# Patient Record
Sex: FEMALE | Race: BLACK OR AFRICAN AMERICAN | NOT HISPANIC OR LATINO | URBAN - METROPOLITAN AREA
[De-identification: names, ages, dates, MRNs, and addresses within clinical notes are randomized per-mention and may not be internally consistent; named-entity substitution may affect disease eponyms.]

---

## 2017-01-18 ENCOUNTER — EMERGENCY (EMERGENCY)
Facility: HOSPITAL | Age: 32
LOS: 1 days | Discharge: HOME | End: 2017-01-18
Admitting: EMERGENCY MEDICINE
Payer: COMMERCIAL

## 2017-01-18 VITALS
HEART RATE: 113 BPM | TEMPERATURE: 99 F | OXYGEN SATURATION: 100 % | SYSTOLIC BLOOD PRESSURE: 115 MMHG | DIASTOLIC BLOOD PRESSURE: 76 MMHG | RESPIRATION RATE: 20 BRPM

## 2017-01-18 LAB
ALBUMIN SERPL ELPH-MCNC: 4.3 G/DL — SIGNIFICANT CHANGE UP (ref 3.3–5)
ALP SERPL-CCNC: 96 U/L — SIGNIFICANT CHANGE UP (ref 40–120)
ALT FLD-CCNC: 17 U/L — SIGNIFICANT CHANGE UP (ref 4–33)
AST SERPL-CCNC: 15 U/L — SIGNIFICANT CHANGE UP (ref 4–32)
BASOPHILS # BLD AUTO: 0 K/UL — SIGNIFICANT CHANGE UP (ref 0–0.2)
BASOPHILS NFR BLD AUTO: 0 % — SIGNIFICANT CHANGE UP (ref 0–2)
BILIRUB SERPL-MCNC: 0.5 MG/DL — SIGNIFICANT CHANGE UP (ref 0.2–1.2)
BUN SERPL-MCNC: 8 MG/DL — SIGNIFICANT CHANGE UP (ref 7–23)
CALCIUM SERPL-MCNC: 8.7 MG/DL — SIGNIFICANT CHANGE UP (ref 8.4–10.5)
CHLORIDE SERPL-SCNC: 98 MMOL/L — SIGNIFICANT CHANGE UP (ref 98–107)
CO2 SERPL-SCNC: 26 MMOL/L — SIGNIFICANT CHANGE UP (ref 22–31)
CREAT SERPL-MCNC: 0.76 MG/DL — SIGNIFICANT CHANGE UP (ref 0.5–1.3)
EOSINOPHIL # BLD AUTO: 0.01 K/UL — SIGNIFICANT CHANGE UP (ref 0–0.5)
EOSINOPHIL NFR BLD AUTO: 0.2 % — SIGNIFICANT CHANGE UP (ref 0–6)
GLUCOSE SERPL-MCNC: 95 MG/DL — SIGNIFICANT CHANGE UP (ref 70–99)
HCT VFR BLD CALC: 40.6 % — SIGNIFICANT CHANGE UP (ref 34.5–45)
HGB BLD-MCNC: 13 G/DL — SIGNIFICANT CHANGE UP (ref 11.5–15.5)
IMM GRANULOCYTES NFR BLD AUTO: 0.2 % — SIGNIFICANT CHANGE UP (ref 0–1.5)
LIDOCAIN IGE QN: 24.3 U/L — SIGNIFICANT CHANGE UP (ref 7–60)
LYMPHOCYTES # BLD AUTO: 1.05 K/UL — SIGNIFICANT CHANGE UP (ref 1–3.3)
LYMPHOCYTES # BLD AUTO: 16.2 % — SIGNIFICANT CHANGE UP (ref 13–44)
MCHC RBC-ENTMCNC: 24.9 PG — LOW (ref 27–34)
MCHC RBC-ENTMCNC: 32 % — SIGNIFICANT CHANGE UP (ref 32–36)
MCV RBC AUTO: 77.6 FL — LOW (ref 80–100)
MONOCYTES # BLD AUTO: 0.3 K/UL — SIGNIFICANT CHANGE UP (ref 0–0.9)
MONOCYTES NFR BLD AUTO: 4.6 % — SIGNIFICANT CHANGE UP (ref 2–14)
NEUTROPHILS # BLD AUTO: 5.12 K/UL — SIGNIFICANT CHANGE UP (ref 1.8–7.4)
NEUTROPHILS NFR BLD AUTO: 78.8 % — HIGH (ref 43–77)
PLATELET # BLD AUTO: 322 K/UL — SIGNIFICANT CHANGE UP (ref 150–400)
PMV BLD: 9.3 FL — SIGNIFICANT CHANGE UP (ref 7–13)
POTASSIUM SERPL-MCNC: 3.3 MMOL/L — LOW (ref 3.5–5.3)
POTASSIUM SERPL-SCNC: 3.3 MMOL/L — LOW (ref 3.5–5.3)
PROT SERPL-MCNC: 8.1 G/DL — SIGNIFICANT CHANGE UP (ref 6–8.3)
RBC # BLD: 5.23 M/UL — HIGH (ref 3.8–5.2)
RBC # FLD: 14.3 % — SIGNIFICANT CHANGE UP (ref 10.3–14.5)
SODIUM SERPL-SCNC: 139 MMOL/L — SIGNIFICANT CHANGE UP (ref 135–145)
WBC # BLD: 6.49 K/UL — SIGNIFICANT CHANGE UP (ref 3.8–10.5)
WBC # FLD AUTO: 6.49 K/UL — SIGNIFICANT CHANGE UP (ref 3.8–10.5)

## 2017-01-18 PROCEDURE — 99284 EMERGENCY DEPT VISIT MOD MDM: CPT

## 2017-01-18 RX ORDER — ACETAMINOPHEN 500 MG
650 TABLET ORAL ONCE
Qty: 0 | Refills: 0 | Status: COMPLETED | OUTPATIENT
Start: 2017-01-18 | End: 2017-01-18

## 2017-01-18 RX ORDER — FAMOTIDINE 10 MG/ML
20 INJECTION INTRAVENOUS ONCE
Qty: 0 | Refills: 0 | Status: COMPLETED | OUTPATIENT
Start: 2017-01-18 | End: 2017-01-18

## 2017-01-18 RX ORDER — SODIUM CHLORIDE 9 MG/ML
1000 INJECTION INTRAMUSCULAR; INTRAVENOUS; SUBCUTANEOUS ONCE
Qty: 0 | Refills: 0 | Status: COMPLETED | OUTPATIENT
Start: 2017-01-18 | End: 2017-01-18

## 2017-01-18 RX ORDER — ONDANSETRON 8 MG/1
4 TABLET, FILM COATED ORAL ONCE
Qty: 0 | Refills: 0 | Status: COMPLETED | OUTPATIENT
Start: 2017-01-18 | End: 2017-01-18

## 2017-01-18 RX ORDER — ONDANSETRON 8 MG/1
1 TABLET, FILM COATED ORAL
Qty: 9 | Refills: 0 | OUTPATIENT
Start: 2017-01-18 | End: 2017-01-21

## 2017-01-18 RX ADMIN — Medication 650 MILLIGRAM(S): at 22:51

## 2017-01-18 RX ADMIN — FAMOTIDINE 20 MILLIGRAM(S): 10 INJECTION INTRAVENOUS at 20:58

## 2017-01-18 RX ADMIN — SODIUM CHLORIDE 1000 MILLILITER(S): 9 INJECTION INTRAMUSCULAR; INTRAVENOUS; SUBCUTANEOUS at 20:55

## 2017-01-18 RX ADMIN — ONDANSETRON 4 MILLIGRAM(S): 8 TABLET, FILM COATED ORAL at 20:59

## 2017-01-18 RX ADMIN — ONDANSETRON 4 MILLIGRAM(S): 8 TABLET, FILM COATED ORAL at 22:57

## 2017-01-18 NOTE — ED PROVIDER NOTE - PROGRESS NOTE DETAILS
BHAKTI Deal- Pt feeling better after ER stay, tolerating PO, abd soft nt nd, afebrile. stable for dc. Pt to f/u with PMD

## 2017-01-18 NOTE — ED PROVIDER NOTE - OBJECTIVE STATEMENT
32 y/o female no pmh presents c/o n/v/d x1 day. Pt admits to waking up in the middle of the night with nausea and vomiting. Pt admits to multiple episodes of vomiting and diarrhea, nbnb. Pt also c/o intermittent frontal headache, throbbing, no aggravating or relieving factors. Pt admits to not tolerating PO for 1 day. Pt denies recent travel, sick contacts or strange food. Denies chest pain, sob, abd pain, dysuria, neck pain, dizziness, weakness, syncope, or fever.

## 2017-09-20 ENCOUNTER — APPOINTMENT (OUTPATIENT)
Dept: HUMAN REPRODUCTION | Facility: CLINIC | Age: 32
End: 2017-09-20

## 2017-12-13 ENCOUNTER — APPOINTMENT (OUTPATIENT)
Dept: OBGYN | Facility: CLINIC | Age: 32
End: 2017-12-13

## 2018-01-11 ENCOUNTER — OUTPATIENT (OUTPATIENT)
Dept: OUTPATIENT SERVICES | Facility: HOSPITAL | Age: 33
LOS: 1 days | End: 2018-01-11
Payer: COMMERCIAL

## 2018-01-11 VITALS
TEMPERATURE: 98 F | OXYGEN SATURATION: 99 % | HEIGHT: 63 IN | SYSTOLIC BLOOD PRESSURE: 98 MMHG | RESPIRATION RATE: 16 BRPM | HEART RATE: 91 BPM | WEIGHT: 214.95 LBS | DIASTOLIC BLOOD PRESSURE: 68 MMHG

## 2018-01-11 DIAGNOSIS — Z98.891 HISTORY OF UTERINE SCAR FROM PREVIOUS SURGERY: Chronic | ICD-10-CM

## 2018-01-11 DIAGNOSIS — Z33.2 ENCOUNTER FOR ELECTIVE TERMINATION OF PREGNANCY: Chronic | ICD-10-CM

## 2018-01-11 DIAGNOSIS — Z98.890 OTHER SPECIFIED POSTPROCEDURAL STATES: Chronic | ICD-10-CM

## 2018-01-11 DIAGNOSIS — O02.1 MISSED ABORTION: ICD-10-CM

## 2018-01-11 DIAGNOSIS — Z90.49 ACQUIRED ABSENCE OF OTHER SPECIFIED PARTS OF DIGESTIVE TRACT: Chronic | ICD-10-CM

## 2018-01-11 DIAGNOSIS — G56.00 CARPAL TUNNEL SYNDROME, UNSPECIFIED UPPER LIMB: Chronic | ICD-10-CM

## 2018-01-11 LAB
BLD GP AB SCN SERPL QL: NEGATIVE — SIGNIFICANT CHANGE UP
HCT VFR BLD CALC: 42 % — SIGNIFICANT CHANGE UP (ref 34.5–45)
HGB BLD-MCNC: 13.7 G/DL — SIGNIFICANT CHANGE UP (ref 11.5–15.5)
MCHC RBC-ENTMCNC: 26.1 PG — LOW (ref 27–34)
MCHC RBC-ENTMCNC: 32.6 % — SIGNIFICANT CHANGE UP (ref 32–36)
MCV RBC AUTO: 80 FL — SIGNIFICANT CHANGE UP (ref 80–100)
NRBC # FLD: 0 — SIGNIFICANT CHANGE UP
PLATELET # BLD AUTO: 292 K/UL — SIGNIFICANT CHANGE UP (ref 150–400)
PMV BLD: 9.2 FL — SIGNIFICANT CHANGE UP (ref 7–13)
RBC # BLD: 5.25 M/UL — HIGH (ref 3.8–5.2)
RBC # FLD: 14.3 % — SIGNIFICANT CHANGE UP (ref 10.3–14.5)
RH IG SCN BLD-IMP: POSITIVE — SIGNIFICANT CHANGE UP
WBC # BLD: 6.08 K/UL — SIGNIFICANT CHANGE UP (ref 3.8–10.5)
WBC # FLD AUTO: 6.08 K/UL — SIGNIFICANT CHANGE UP (ref 3.8–10.5)

## 2018-01-11 PROCEDURE — 93010 ELECTROCARDIOGRAM REPORT: CPT

## 2018-01-11 NOTE — H&P PST ADULT - RS GEN PE MLT RESP DETAILS PC
no chest wall tenderness/respirations non-labored/airway patent/breath sounds equal/good air movement/clear to auscultation bilaterally

## 2018-01-11 NOTE — H&P PST ADULT - PSH
Carpal tunnel syndrome    H/O  section  x1  History of cholecystectomy    S/P D&C (status post dilation and curettage)  2/2 missed AB in   Termination of pregnancy (fetus)   Carpal tunnel syndrome    H/O  section  x1  History of cholecystectomy  2005  History of myomectomy    S/P D&C (status post dilation and curettage)  2/2 missed AB in   Termination of pregnancy (fetus)

## 2018-01-11 NOTE — H&P PST ADULT - NEGATIVE BREAST SYMPTOMS
no breast lump L/no breast lump R/no nipple discharge L/no breast tenderness R/no nipple discharge R/no breast tenderness L

## 2018-01-11 NOTE — H&P PST ADULT - CARDIOVASCULAR COMMENTS
pt reports periods of chest discomfort not precipitated by any specific activity , last episode was " a few months ago". Pt states episodes are rare only lasting a few seconds. Denies if discomfort travels down left arm or jaw. pt reports periods of chest discomfort not precipitated by any specific activity , last episode was " a few months ago". No episodes since. Pt states episodes are rare only lasting a few seconds. Denies if discomfort travels down left arm or jaw.

## 2018-01-11 NOTE — H&P PST ADULT - PMH
Anemia  pt states she was informed by her GYN that she may have thalasemmia due to results of recent blood work. Pt was to follow up for additional blood work but has not gone yet  Missed ab    Vitamin D deficiency Anemia  pt states she was informed by her GYN that she may have thalassemia due to results of recent blood work. Pt was to follow up for additional blood work but has not gone yet  Missed ab    Vitamin D deficiency

## 2018-01-11 NOTE — H&P PST ADULT - HISTORY OF PRESENT ILLNESS
31 y/o female  presents to PST for preoperative evaluation with dx of missed . Pt states 4 days ago she began cramping and noticed vaginal spotting. Seen by OB/GYN and s/p sonogram which did not note a fetal heart beat. Scheduled for Dilation Vacuum Curettage on 2018. 31 y/o female  presents to PST for preoperative evaluation with dx of missed . Pt states 4 days ago she began cramping and noticed vaginal spotting. Seen by OB/GYN and s/p sonogram which did not  a fetal heartbeat. Scheduled for Dilation Vacuum Curettage on 2018.

## 2018-01-11 NOTE — H&P PST ADULT - NSANTHOSAYNRD_GEN_A_CORE
No. DIVYA screening performed.  STOP BANG Legend: 0-2 = LOW Risk; 3-4 = INTERMEDIATE Risk; 5-8 = HIGH Risk

## 2018-01-11 NOTE — H&P PST ADULT - PROBLEM SELECTOR PLAN 1
Scheduled for Dilation Vacuum Curettage on 1/12/2018.  Pre op instructions given, pt verbalized understanding   GI prophylaxis provided  Next day case discussed with PST Anesthesiologist Dr. Franco

## 2018-01-12 ENCOUNTER — APPOINTMENT (OUTPATIENT)
Dept: ANTEPARTUM | Facility: CLINIC | Age: 33
End: 2018-01-12

## 2018-01-13 ENCOUNTER — TRANSCRIPTION ENCOUNTER (OUTPATIENT)
Age: 33
End: 2018-01-13

## 2018-05-04 ENCOUNTER — EMERGENCY (EMERGENCY)
Facility: HOSPITAL | Age: 33
LOS: 1 days | Discharge: HOME | End: 2018-05-04
Attending: EMERGENCY MEDICINE | Admitting: EMERGENCY MEDICINE
Payer: COMMERCIAL

## 2018-05-04 VITALS
DIASTOLIC BLOOD PRESSURE: 67 MMHG | TEMPERATURE: 99 F | RESPIRATION RATE: 16 BRPM | OXYGEN SATURATION: 99 % | HEART RATE: 96 BPM | SYSTOLIC BLOOD PRESSURE: 119 MMHG

## 2018-05-04 VITALS
HEART RATE: 92 BPM | OXYGEN SATURATION: 100 % | TEMPERATURE: 99 F | RESPIRATION RATE: 16 BRPM | DIASTOLIC BLOOD PRESSURE: 70 MMHG | SYSTOLIC BLOOD PRESSURE: 114 MMHG

## 2018-05-04 DIAGNOSIS — Z33.2 ENCOUNTER FOR ELECTIVE TERMINATION OF PREGNANCY: Chronic | ICD-10-CM

## 2018-05-04 DIAGNOSIS — G56.00 CARPAL TUNNEL SYNDROME, UNSPECIFIED UPPER LIMB: Chronic | ICD-10-CM

## 2018-05-04 DIAGNOSIS — Z98.890 OTHER SPECIFIED POSTPROCEDURAL STATES: Chronic | ICD-10-CM

## 2018-05-04 DIAGNOSIS — Z90.49 ACQUIRED ABSENCE OF OTHER SPECIFIED PARTS OF DIGESTIVE TRACT: Chronic | ICD-10-CM

## 2018-05-04 DIAGNOSIS — Z98.891 HISTORY OF UTERINE SCAR FROM PREVIOUS SURGERY: Chronic | ICD-10-CM

## 2018-05-04 PROCEDURE — 99283 EMERGENCY DEPT VISIT LOW MDM: CPT

## 2018-05-04 RX ORDER — IBUPROFEN 200 MG
600 TABLET ORAL ONCE
Qty: 0 | Refills: 0 | Status: COMPLETED | OUTPATIENT
Start: 2018-05-04 | End: 2018-05-04

## 2018-05-04 RX ORDER — AMOXICILLIN 250 MG/5ML
1 SUSPENSION, RECONSTITUTED, ORAL (ML) ORAL
Qty: 21 | Refills: 0
Start: 2018-05-04 | End: 2018-05-10

## 2018-05-04 RX ADMIN — Medication 600 MILLIGRAM(S): at 10:03

## 2018-05-04 NOTE — ED PROVIDER NOTE - OBJECTIVE STATEMENT
33 yo F pt with no sig PMHx, arrives to the ED c/o worsening (yesterday) left lower jaw pain/swelling and left lower dental pain for 2 days. Pt notes that she noticed her left lower tooth was cracked. Pt notes taking one Amoxicillin yesterday; no other meds. taken. LNMP: "mid-April." Social hx; occasional EtOH use. Denies fever, difficulty swallowing, or any other complaints. No daily meds. NKDA.

## 2018-05-04 NOTE — ED PROVIDER NOTE - PMH
Anemia  pt states she was informed by her GYN that she may have thalassemia due to results of recent blood work. Pt was to follow up for additional blood work but has not gone yet  Missed ab    Vitamin D deficiency

## 2018-05-04 NOTE — CONSULT NOTE ADULT - SUBJECTIVE AND OBJECTIVE BOX
Patient is a 32y old  Female who presents with a chief complaint of pain and swelling in the LL quadrant     HPI: Pt reports having a throbbing pain last night and waking up in the morning with swelling. LL quadrant swollen and hot/tender to touch       PAST MEDICAL & SURGICAL HISTORY:  Vitamin D deficiency  Missed ab  Anemia: pt states she was informed by her GYN that she may have thalassemia due to results of recent blood work. Pt was to follow up for additional blood work but has not gone yet  History of myomectomy  Carpal tunnel syndrome  H/O  section: x1  Termination of pregnancy (fetus):   S/P D&C (status post dilation and curettage): 2/2 missed AB in   History of cholecystectomy:       MEDICATIONS  (STANDING):    MEDICATIONS  (PRN): none       Allergies:No Known Allergies    FAMILY HISTORY:  No pertinent family history in first degree relatives      *SOCIAL HISTORY: (guardian or who pt came with), (smoking hx)    *Last Dental Visit:    Vital Signs Last 24 Hrs  T(C): 37.1 (04 May 2018 11:26), Max: 37.2 (04 May 2018 08:40)  T(F): 98.7 (04 May 2018 11:26), Max: 98.9 (04 May 2018 08:40)  HR: 92 (04 May 2018 11:26) (92 - 96)  BP: 114/70 (04 May 2018 11:26) (114/70 - 119/67)  BP(mean): --  RR: 16 (04 May 2018 11:26) (16 - 16)  SpO2: 100% (04 May 2018 11:26) (99% - 100%)    EOE:  TMJ (  - ) clicks                    ( -   ) pops                    (  -  ) crepitus             Mandible FROM             Facial bones and MOM grossly intact             ( -  ) trismus             (  - ) LAD             (  + ) swelling LL quadrant             ( +  ) asymmetry LL- hot and tender to palpation              ( +  ) palpation- LL hot and tender             IOE:  permanent dentition: grossly intact           hard/soft palate:  (  - ) palatal torus           tongue/FOM WNL           labial/buccal mucosa WNL           vestibular fullness associated with buccal of # 19            # 19 D fracture- with gingiva overgrowth            ( +  ) percussion # 19           (  + ) palpation # 19            *DENTAL RADIOGRAPHS: 1 PA taken     RADIOLOGY & ADDITIONAL STUDIES:    ASSESSMENT: fractured # 19 with PARL associated with M and D roots.     PROCEDURE:  Verbal  consent given. EOE and IOE performed. 1 PA taken- PARL noted around apices of # 19. 2.5 carpules of 2% lidocaine with 1:100k epi administered via IANB and infiltration to area of # 19. I&D completed with 15 blade and blunt dissection. Heme and purulence noted. Irrigated with saline. Iodoform and silk suture placed. POI given      RECOMMENDATIONS:  1) Abx per ED  2) Dental F/U with outpatient dentist for comprehensive dental care.   3) If any difficulty swallowing/breathing, fever occur, page dental.   Alison Encarnacion DDS [id number]

## 2018-05-04 NOTE — ED ADULT TRIAGE NOTE - CHIEF COMPLAINT QUOTE
pt c/o left sided jaw swelling that started yesterday but has gotten progressively worse throughout the night. pt has cracked tooth on the left side but denies any pain. denies any fevers. denies any difficulty breathing. respirations even and unlabored, pt able to swallow, no drooling. pmhx: vit d deficiency, anemia

## 2018-05-04 NOTE — ED ADULT NURSE NOTE - OBJECTIVE STATEMENT
pt A&Ox3 left sided jaw swelling noted pt able to swallow speaking full sentences, pt waiting for dental

## 2018-05-04 NOTE — ED PROVIDER NOTE - PROGRESS NOTE DETAILS
Seen and I&D by dental who states DC on Amoxicillin f/u dental clinic mon for drain removal  DC instructions:   You had an abscess of tooth #19 drained and need to return Monday to dental clinic for drain removal: tel [phone]. Take Amoxicillin 500mg 3x daily, For pain Ibuprofen 600mg with food every 6 hours

## 2019-03-01 ENCOUNTER — APPOINTMENT (OUTPATIENT)
Dept: ANTEPARTUM | Facility: CLINIC | Age: 34
End: 2019-03-01
Payer: COMMERCIAL

## 2019-03-01 ENCOUNTER — ASOB RESULT (OUTPATIENT)
Age: 34
End: 2019-03-01

## 2019-03-01 PROCEDURE — 76813 OB US NUCHAL MEAS 1 GEST: CPT

## 2019-03-01 PROCEDURE — 36416 COLLJ CAPILLARY BLOOD SPEC: CPT

## 2019-03-01 PROCEDURE — 76801 OB US < 14 WKS SINGLE FETUS: CPT

## 2019-04-25 ENCOUNTER — APPOINTMENT (OUTPATIENT)
Dept: ANTEPARTUM | Facility: CLINIC | Age: 34
End: 2019-04-25
Payer: COMMERCIAL

## 2019-04-25 ENCOUNTER — ASOB RESULT (OUTPATIENT)
Age: 34
End: 2019-04-25

## 2019-04-25 PROCEDURE — 76817 TRANSVAGINAL US OBSTETRIC: CPT

## 2019-04-25 PROCEDURE — 76811 OB US DETAILED SNGL FETUS: CPT

## 2019-05-08 ENCOUNTER — APPOINTMENT (OUTPATIENT)
Dept: ANTEPARTUM | Facility: CLINIC | Age: 34
End: 2019-05-08
Payer: COMMERCIAL

## 2019-05-08 ENCOUNTER — ASOB RESULT (OUTPATIENT)
Age: 34
End: 2019-05-08

## 2019-05-08 PROCEDURE — 76817 TRANSVAGINAL US OBSTETRIC: CPT

## 2019-05-08 PROCEDURE — 76816 OB US FOLLOW-UP PER FETUS: CPT

## 2019-05-16 ENCOUNTER — OUTPATIENT (OUTPATIENT)
Dept: OUTPATIENT SERVICES | Facility: HOSPITAL | Age: 34
LOS: 1 days | Discharge: HOME | End: 2019-05-16

## 2019-05-16 VITALS — HEART RATE: 101 BPM | DIASTOLIC BLOOD PRESSURE: 74 MMHG | SYSTOLIC BLOOD PRESSURE: 118 MMHG

## 2019-05-16 VITALS
DIASTOLIC BLOOD PRESSURE: 80 MMHG | SYSTOLIC BLOOD PRESSURE: 129 MMHG | RESPIRATION RATE: 16 BRPM | TEMPERATURE: 99 F | HEART RATE: 99 BPM

## 2019-05-16 DIAGNOSIS — Z98.890 OTHER SPECIFIED POSTPROCEDURAL STATES: Chronic | ICD-10-CM

## 2019-05-16 DIAGNOSIS — Z90.49 ACQUIRED ABSENCE OF OTHER SPECIFIED PARTS OF DIGESTIVE TRACT: Chronic | ICD-10-CM

## 2019-05-16 DIAGNOSIS — Z98.891 HISTORY OF UTERINE SCAR FROM PREVIOUS SURGERY: Chronic | ICD-10-CM

## 2019-05-16 DIAGNOSIS — G56.00 CARPAL TUNNEL SYNDROME, UNSPECIFIED UPPER LIMB: Chronic | ICD-10-CM

## 2019-05-16 DIAGNOSIS — Z3A.00 WEEKS OF GESTATION OF PREGNANCY NOT SPECIFIED: ICD-10-CM

## 2019-05-16 DIAGNOSIS — Z33.2 ENCOUNTER FOR ELECTIVE TERMINATION OF PREGNANCY: Chronic | ICD-10-CM

## 2019-05-16 DIAGNOSIS — O26.899 OTHER SPECIFIED PREGNANCY RELATED CONDITIONS, UNSPECIFIED TRIMESTER: ICD-10-CM

## 2019-05-16 LAB
APPEARANCE UR: CLEAR — SIGNIFICANT CHANGE UP
BILIRUB UR-MCNC: NEGATIVE — SIGNIFICANT CHANGE UP
BLOOD UR QL VISUAL: NEGATIVE — SIGNIFICANT CHANGE UP
COLOR SPEC: SIGNIFICANT CHANGE UP
GLUCOSE UR-MCNC: NEGATIVE — SIGNIFICANT CHANGE UP
KETONES UR-MCNC: NEGATIVE — SIGNIFICANT CHANGE UP
LEUKOCYTE ESTERASE UR-ACNC: NEGATIVE — SIGNIFICANT CHANGE UP
NITRITE UR-MCNC: NEGATIVE — SIGNIFICANT CHANGE UP
PH UR: 6 — SIGNIFICANT CHANGE UP (ref 5–8)
PROT UR-MCNC: NEGATIVE — SIGNIFICANT CHANGE UP
SP GR SPEC: 1.01 — SIGNIFICANT CHANGE UP (ref 1–1.04)
UROBILINOGEN FLD QL: NORMAL — SIGNIFICANT CHANGE UP

## 2019-05-16 NOTE — OB PROVIDER TRIAGE NOTE - HISTORY OF PRESENT ILLNESS
Dr. Becerra's pt. is a 34y/o EGA 22.6wks . Pt. reports of constant pelvic pressure since yesterday. Pt. denies abdominal cramping, LOF, VB, or decrease FM.

## 2019-05-16 NOTE — OB RN TRIAGE NOTE - PMH
Anemia  pt states she was informed by her GYN that she may have thalassemia due to results of recent blood work. Pt was to follow up for additional blood work but has not gone yet  Missed ab    Spontaneous     Termination of pregnancy (fetus)    Vitamin D deficiency

## 2019-05-16 NOTE — OB PROVIDER TRIAGE NOTE - NSHPPHYSICALEXAM_GEN_ALL_CORE
Assessment/Plan:  TAS:Cephalic presentation, posterior placenta, FHR:155bpm, MVP:6.4, EFW:563.92gm, anterior myomia 1.4x1.57  Speculum:Cervix appears closed  TVS:CL:4.4 no funneling or dynamic changes  (ATU 5/8/19 CL:4.5cm)  U/A:Ketone:Neg., Blood:Neg., Nitrite:Neg., Leukocyte Esterase:Neg.  No contractions on TOCO

## 2019-05-16 NOTE — OB PROVIDER TRIAGE NOTE - NSOBPROVIDERNOTE_OBGYN_ALL_OB_FT
Dr. Becerra's pt. is a 34y/o EGA 22.6wks . Pt. reports of constant pelvic pressure since yesterday. Pt. denies abdominal cramping, LOF, VB, or decrease FM.    AP:Denies  Medical Hx: Carpel tunnel  Surgical Hx: Myomectomy , Cholecystectomy    OBGYN Hx: Primary  @32wks PPROM  NRFHT 4-7  Fibroid x1   Meds:[facility] IM weekly on , PNV  NKDA    Assessment/Plan:  TAS:Cephalic presentation, posterior placenta, FHR:155bpm, MVP:6.4, EFW:563.92gm, anterior myomia 1.4x1.57  Speculum:Cervix appears closed  TVS:CL:4.4 no funneling or dynamic changes  (ATU 19 CL:4.5cm)  U/A:Ketone:Neg., Blood:Neg., Nitrite:Neg., Leukocyte Esterase:Neg.  No contractions on TOCO    No evidence of  labor at this time. Pt. to follow up with next OB appointment. Dr. Becerra's pt. is a 34y/o EGA 22.6wks . Pt. reports of constant pelvic pressure since yesterday. Pt. denies abdominal cramping, LOF, VB, or decrease FM.    AP:Denies  Medical Hx: Carpel tunnel  Surgical Hx: Myomectomy , Cholecystectomy    OBGYN Hx: Primary  @32wks PPROM  NRFHT 4-7  Fibroid x1   Meds: [facility] IM weekly on , PNV  NKDA    Assessment/Plan:  TAS:Cephalic presentation, posterior placenta, FHR:155bpm, MVP:6.4, EFW:563.92gm, anterior myoma 1.4x1.57  Speculum: Cervix appears closed  TVS:CL:4.4 no funneling or dynamic changes  (ATU 19 CL:4.5cm)  U/A:Ketone:Neg., Blood:Neg., Nitrite:Neg., Leukocyte Esterase:Neg.  No contractions on TOCO    No evidence of  labor at this time. Pt. to follow up with next OB appointment. Pt. instructed to return to triage with increase abdominal cramping, LOF, VB, or decrease FM. Increase PO hydration encouraged.

## 2019-05-16 NOTE — OB PROVIDER TRIAGE NOTE - ADDITIONAL INSTRUCTIONS
No evidence of  labor at this time. Pt. to follow up with next OB appointment. Pt. instructed to return to triage with increase abdominal cramping, LOF, VB, or decrease FM. Increase PO hydration encouraged

## 2019-05-16 NOTE — OB RN TRIAGE NOTE - PSH
Carpal tunnel syndrome    H/O  section  PPROM 32 weeks, NRFHT #4-7 2015  History of cholecystectomy    History of myomectomy    S/P D&C (status post dilation and curettage)  2/2 missed AB in   Termination of pregnancy (fetus)

## 2019-05-23 ENCOUNTER — ASOB RESULT (OUTPATIENT)
Age: 34
End: 2019-05-23

## 2019-05-23 ENCOUNTER — APPOINTMENT (OUTPATIENT)
Dept: ANTEPARTUM | Facility: CLINIC | Age: 34
End: 2019-05-23
Payer: COMMERCIAL

## 2019-05-23 PROCEDURE — 76817 TRANSVAGINAL US OBSTETRIC: CPT

## 2019-05-23 PROCEDURE — 76815 OB US LIMITED FETUS(S): CPT

## 2019-06-19 ENCOUNTER — ASOB RESULT (OUTPATIENT)
Age: 34
End: 2019-06-19

## 2019-06-19 ENCOUNTER — APPOINTMENT (OUTPATIENT)
Dept: ANTEPARTUM | Facility: CLINIC | Age: 34
End: 2019-06-19
Payer: COMMERCIAL

## 2019-06-19 PROCEDURE — 76816 OB US FOLLOW-UP PER FETUS: CPT

## 2019-07-29 ENCOUNTER — APPOINTMENT (OUTPATIENT)
Dept: ANTEPARTUM | Facility: CLINIC | Age: 34
End: 2019-07-29
Payer: COMMERCIAL

## 2019-07-29 ENCOUNTER — ASOB RESULT (OUTPATIENT)
Age: 34
End: 2019-07-29

## 2019-07-29 PROCEDURE — 76816 OB US FOLLOW-UP PER FETUS: CPT

## 2019-07-29 PROCEDURE — 76819 FETAL BIOPHYS PROFIL W/O NST: CPT

## 2019-08-15 ENCOUNTER — INPATIENT (INPATIENT)
Facility: HOSPITAL | Age: 34
LOS: 0 days | Discharge: HOME | End: 2019-08-16
Attending: OBSTETRICS & GYNECOLOGY | Admitting: OBSTETRICS & GYNECOLOGY

## 2019-08-15 VITALS
RESPIRATION RATE: 16 BRPM | DIASTOLIC BLOOD PRESSURE: 67 MMHG | HEART RATE: 100 BPM | SYSTOLIC BLOOD PRESSURE: 115 MMHG | TEMPERATURE: 99 F
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
## 2019-08-15 DIAGNOSIS — Z33.2 ENCOUNTER FOR ELECTIVE TERMINATION OF PREGNANCY: Chronic | ICD-10-CM

## 2019-08-15 DIAGNOSIS — Z98.890 OTHER SPECIFIED POSTPROCEDURAL STATES: Chronic | ICD-10-CM

## 2019-08-15 DIAGNOSIS — Z90.49 ACQUIRED ABSENCE OF OTHER SPECIFIED PARTS OF DIGESTIVE TRACT: Chronic | ICD-10-CM

## 2019-08-15 DIAGNOSIS — Z3A.00 WEEKS OF GESTATION OF PREGNANCY NOT SPECIFIED: ICD-10-CM

## 2019-08-15 DIAGNOSIS — G56.00 CARPAL TUNNEL SYNDROME, UNSPECIFIED UPPER LIMB: Chronic | ICD-10-CM

## 2019-08-15 DIAGNOSIS — Z98.891 HISTORY OF UTERINE SCAR FROM PREVIOUS SURGERY: Chronic | ICD-10-CM

## 2019-08-15 DIAGNOSIS — O26.899 OTHER SPECIFIED PREGNANCY RELATED CONDITIONS, UNSPECIFIED TRIMESTER: ICD-10-CM

## 2019-08-15 LAB
BLD GP AB SCN SERPL QL: NEGATIVE — SIGNIFICANT CHANGE UP
FIBRINOGEN PPP-MCNC: > 752 MG/DL — HIGH (ref 350–510)
HCT VFR BLD CALC: 37.1 % — SIGNIFICANT CHANGE UP (ref 34.5–45)
HGB BLD-MCNC: 11.9 G/DL — SIGNIFICANT CHANGE UP (ref 11.5–15.5)
MCHC RBC-ENTMCNC: 24.7 PG — LOW (ref 27–34)
MCHC RBC-ENTMCNC: 32.1 % — SIGNIFICANT CHANGE UP (ref 32–36)
MCV RBC AUTO: 77 FL — LOW (ref 80–100)
NRBC # FLD: 0 K/UL — SIGNIFICANT CHANGE UP (ref 0–0)
PLATELET # BLD AUTO: 262 K/UL — SIGNIFICANT CHANGE UP (ref 150–400)
PMV BLD: 9.1 FL — SIGNIFICANT CHANGE UP (ref 7–13)
RBC # BLD: 4.82 M/UL — SIGNIFICANT CHANGE UP (ref 3.8–5.2)
RBC # FLD: 15.2 % — HIGH (ref 10.3–14.5)
RH IG SCN BLD-IMP: POSITIVE — SIGNIFICANT CHANGE UP
WBC # BLD: 9.5 K/UL — SIGNIFICANT CHANGE UP (ref 3.8–10.5)
WBC # FLD AUTO: 9.5 K/UL — SIGNIFICANT CHANGE UP (ref 3.8–10.5)

## 2019-08-15 RX ORDER — OXYTOCIN 10 UNIT/ML
333.33 VIAL (ML) INJECTION
Qty: 20 | Refills: 0 | Status: DISCONTINUED | OUTPATIENT
Start: 2019-08-15 | End: 2019-08-16

## 2019-08-15 RX ORDER — SODIUM CHLORIDE 9 MG/ML
1000 INJECTION, SOLUTION INTRAVENOUS
Refills: 0 | Status: DISCONTINUED | OUTPATIENT
Start: 2019-08-15 | End: 2019-08-16

## 2019-08-15 RX ADMIN — SODIUM CHLORIDE 125 MILLILITER(S): 9 INJECTION, SOLUTION INTRAVENOUS at 18:17

## 2019-08-15 NOTE — OB PROVIDER TRIAGE NOTE - NS_PELVICEXAM_OBGYN_ALL_OB
Patient presents to the ER with right lower quadrant abd pain that started yesterday.  Patient states that it has just been getting worse.  Patient reports nausea but no vomiting.  States that she has been eatting and drinking ok and no change with bowel habits.  Patient denies any fever at this time.  
Deferred

## 2019-08-15 NOTE — OB PROVIDER H&P - ASSESSMENT
34yo  @ 35.6 wks SLIUP uncomp PNC here s/p fall  -NST x 4 hours  -B+ blood type; CBC and fibrinogen stable  -pt was dw Dr Salomon  -----------------  16:20-NST was reviewed with Dr Lance/Aime. NST is cat I with accels and mod variability; frquent ctx's  Plan-pt was admitted for observation

## 2019-08-15 NOTE — OB RN TRIAGE NOTE - CHIEF COMPLAINT QUOTE
s/p fall before 9am unsure if I hit belly it was more like a split and happened so fast  baby moving -but not moving at the same pace that he did

## 2019-08-15 NOTE — OB PROVIDER TRIAGE NOTE - HISTORY OF PRESENT ILLNESS
34yo  @ 35.6 wks SLIUP uncomp PNC here for eval due to fall at 9am. Pt reports she slipped on some muddy water and did a slit and landed on the floor, unsure if she hit her belly. Pt reports GFM and denies VB/LOF/ctx's/cramping. Pt is on [place] with last shot scheduled for tomorrow.    Pmhx-denies  Pshx/Hosp-dvc x 2; cholecstectomy; myomectomy;   Meds-PNV; nexium  Allergies-augmentin->thrush  Gyn-fibroids with myomectomy; abnl PAP  Past ob-SAB x 3 with dvc x 2; TOP x 1              3/15/2015-32wk PPROM with fetal distress->-4#7

## 2019-08-15 NOTE — OB RN PATIENT PROFILE - FALLEN IN THE PAST
How Severe Are Your Spot(S)?: moderate What Is The Reason For Today's Visit?: Skin Lesions What Is The Reason For Today's Visit? (Being Monitored For X): concerning skin lesions on an annual basis yes

## 2019-08-15 NOTE — OB PROVIDER TRIAGE NOTE - NSOBPROVIDERNOTE_OBGYN_ALL_OB_FT
34yo  @ 35.6 wks SLIUP uncomp PNC here s/p fall  -NST x 4 hours  -B+ blood type; CBC and fibrinogen sent 34yo  @ 35.6 wks SLIUP uncomp PNC here s/p fall  -NST x 4 hours  -B+ blood type; CBC and fibrinogen stable  -pt was dw Dr Salomon 34yo  @ 35.6 wks SLIUP uncomp PNC here s/p fall  -NST x 4 hours  -B+ blood type; CBC and fibrinogen stable  -pt was dw Dr Salomon  -----------------  16:20-NST was reviewed with Dr Lance/Aime. NST is cat I with accels and mod variability; frquent ctx's  Plan-pt was admitted for observation

## 2019-08-16 ENCOUNTER — ASOB RESULT (OUTPATIENT)
Age: 34
End: 2019-08-16

## 2019-08-16 ENCOUNTER — TRANSCRIPTION ENCOUNTER (OUTPATIENT)
Age: 34
End: 2019-08-16

## 2019-08-16 ENCOUNTER — OUTPATIENT (OUTPATIENT)
Dept: OUTPATIENT SERVICES | Facility: HOSPITAL | Age: 34
LOS: 1 days | End: 2019-08-16

## 2019-08-16 ENCOUNTER — APPOINTMENT (OUTPATIENT)
Dept: ANTEPARTUM | Facility: HOSPITAL | Age: 34
End: 2019-08-16
Payer: COMMERCIAL

## 2019-08-16 VITALS
OXYGEN SATURATION: 96 % | DIASTOLIC BLOOD PRESSURE: 65 MMHG | TEMPERATURE: 98 F | RESPIRATION RATE: 17 BRPM | HEART RATE: 87 BPM | SYSTOLIC BLOOD PRESSURE: 109 MMHG

## 2019-08-16 DIAGNOSIS — G56.00 CARPAL TUNNEL SYNDROME, UNSPECIFIED UPPER LIMB: Chronic | ICD-10-CM

## 2019-08-16 DIAGNOSIS — Z98.890 OTHER SPECIFIED POSTPROCEDURAL STATES: Chronic | ICD-10-CM

## 2019-08-16 DIAGNOSIS — Z98.891 HISTORY OF UTERINE SCAR FROM PREVIOUS SURGERY: Chronic | ICD-10-CM

## 2019-08-16 DIAGNOSIS — Z90.49 ACQUIRED ABSENCE OF OTHER SPECIFIED PARTS OF DIGESTIVE TRACT: Chronic | ICD-10-CM

## 2019-08-16 DIAGNOSIS — Z33.2 ENCOUNTER FOR ELECTIVE TERMINATION OF PREGNANCY: Chronic | ICD-10-CM

## 2019-08-16 LAB — T PALLIDUM AB TITR SER: NEGATIVE — SIGNIFICANT CHANGE UP

## 2019-08-16 PROCEDURE — 76816 OB US FOLLOW-UP PER FETUS: CPT | Mod: 26

## 2019-08-16 PROCEDURE — 76819 FETAL BIOPHYS PROFIL W/O NST: CPT | Mod: 26

## 2019-08-16 NOTE — DISCHARGE NOTE ANTEPARTUM - HOSPITAL COURSE
34y/o  at 36 wks admitted for prolonged monitoring s/p fall at 9am on 19.  Pt reports she slipped on some muddy water and did a slit and landed on the floor, unsure if she hit her abdomen. Pt denies vaginal bleeding, Leaking fluid, contractions, or abdominal pain. Pt reports good fetal movement. Pt is on [place] for hx of 32wk PPROM in last pregnancy. Patient has had Category one FHT with irregular contractions on [place] that she denies feeling. ATU sono 19:____________________. Patient is stable after 24 hours and ready to be discharged home with close outpatient monitoring by OB within one week. 32y/o  at 36 wks admitted for prolonged monitoring s/p fall at 9am on 8/15/19.  Pt reports she slipped on some muddy water and did a slit and landed on the floor, unsure if she hit her abdomen. Pt denies vaginal bleeding, Leaking fluid, contractions, or abdominal pain. Pt reports good fetal movement. Pt is on [place] for hx of 32wk PPROM in last pregnancy. Patient has had Category one FHT with irregular contractions on [place] that she denies feeling. ATU sono 19: cephalic presentation/ BPP   . Patient is stable after 24 hours and ready to be discharged home with close outpatient monitoring by OB within one week.

## 2019-08-16 NOTE — PROGRESS NOTE ADULT - PROBLEM SELECTOR PLAN 1
- CV: hemodynamically stable  - Resp: saturating well on RA  - GI: reg diet  - Heme: ambulation  - Ob: ATU sono today, discharge home    DEONDRE Berkowitz [id number]

## 2019-08-16 NOTE — PROGRESS NOTE ADULT - ASSESSMENT
34 yo  36w0d a/w fall yesterday. Fetal status reassuring and no active labor or signs of abruption noted

## 2019-08-16 NOTE — DISCHARGE NOTE ANTEPARTUM - CARE PLAN
Principal Discharge DX:	Fall, initial encounter  Goal:	Continue prenatal care  Assessment and plan of treatment:	- Follow up with ob doctor within one week  - Return with contractions, vaginal bleeding, abdominal pain, leaking fluid or decreased fetal movement

## 2019-08-16 NOTE — DISCHARGE NOTE ANTEPARTUM - PLAN OF CARE
Continue prenatal care - Follow up with ob doctor within one week  - Return with contractions, vaginal bleeding, abdominal pain, leaking fluid or decreased fetal movement

## 2019-08-16 NOTE — CHART NOTE - NSCHARTNOTEFT_GEN_A_CORE
NP follow up note:    Patient seen and evaluated for cervical change. Pt denies feeling contractions, vaginal bleeding, leaking fluid. Pt endorses good FM. Cervical unchanged at FT/ 50/-3      PE:  Vital Signs Last 24 Hrs  T(C): 36.7 (16 Aug 2019 10:52), Max: 37.3 (15 Aug 2019 11:53)  T(F): 98.1 (16 Aug 2019 10:52), Max: 99.14 (15 Aug 2019 12:30)  HR: 87 (16 Aug 2019 10:52) (66 - 108)  BP: 109/65 (16 Aug 2019 10:52) (89/54 - 119/76)  BP(mean): --  RR: 17 (16 Aug 2019 10:52) (16 - 17)  SpO2: 96% (16 Aug 2019 10:52) (91% - 99%)  Abd: gravid soft NT  EFM: 135 moderate variability + acels no decels  [place]: irregular  VE: 0.5/50/-3 unchanged    Plan:  -patient 24 hours post fall with Cat. I FHT and BPP 8/8 by ATU scan this AM  - unchanged cervix  -pt may be discharged to home with close outpatient follow up   -Pt to return with contractions, vaginal bleeding, leaking fluid, or decreased fetal movement  (see discharge plan)      d/w Dr Akers and Aubrey CABALLEROP-BC

## 2019-08-16 NOTE — DISCHARGE NOTE ANTEPARTUM - PATIENT PORTAL LINK FT
You can access the [facility] Patient Portal, offered by [facility], by registering with the following website: [email or web address]

## 2019-08-16 NOTE — DISCHARGE NOTE ANTEPARTUM - CARE PROVIDER_API CALL
Iza Crane)  Obstetrics and Gynecology  [address] [place] [address]  Phone: [phone]  Fax: [phone]  Follow Up Time:

## 2019-08-16 NOTE — PROGRESS NOTE ADULT - SUBJECTIVE AND OBJECTIVE BOX
R3 Antepartum Progress Note - HD#2    Subjective  Patient seen and examined at bedside, no acute overnight events. She states that she has not felt any contractions overnight. She reports good fetal movement and denies LOF, VB. Patient is ambulating, voiding spontaneously, passing gas, and tolerating regular diet.     Vital Signs Last 24 Hours  T(C): 36.7 (08-16-19 @ 10:52), Max: 37.3 (08-15-19 @ 11:53)  HR: 87 (08-16-19 @ 10:52) (66 - 108)  BP: 109/65 (08-16-19 @ 10:52) (89/54 - 119/76)  RR: 17 (08-16-19 @ 10:52) (16 - 17)  SpO2: 96% (08-16-19 @ 10:52) (91% - 99%)    Physical Exam:  General: NAD  CV: RRR, no murmurs, S1, S2  Resp: CTA-B, symmetrical expansion  Abdomen: Soft, non-tender, gravid   Ext: no edema/tenderness b/l     FHR tracing reactive    Labs:    Blood Type: B Positive  Antibody Screen: Negative  RPR: Negative               11.9   9.50  )-----------( 262      ( 08-15 @ 12:49 )             37.1         MEDICATIONS  (STANDING):  lactated ringers. 1000 milliLiter(s) (125 mL/Hr) IV Continuous <Continuous>  oxytocin Infusion 333.333 milliUNIT(s)/Min (1000 mL/Hr) IV Continuous <Continuous>    MEDICATIONS  (PRN):

## 2019-08-16 NOTE — DISCHARGE NOTE ANTEPARTUM - MEDICATION SUMMARY - MEDICATIONS TO TAKE
I will START or STAY ON the medications listed below when I get home from the hospital:    Prenatal 1 oral capsule  -- Indication: For pregnancy    [place] 250 mg/mL intramuscular solution  -- intramuscular once a week on fridays  -- Indication: For home med

## 2019-08-17 LAB — SPECIMEN SOURCE: SIGNIFICANT CHANGE UP

## 2019-08-19 LAB — GP B STREP GENITAL QL CULT: SIGNIFICANT CHANGE UP

## 2019-08-29 ENCOUNTER — INPATIENT (INPATIENT)
Facility: HOSPITAL | Age: 34
LOS: 4 days | Discharge: HOME | End: 2019-09-03
Attending: OBSTETRICS & GYNECOLOGY | Admitting: OBSTETRICS & GYNECOLOGY
Payer: COMMERCIAL

## 2019-08-29 ENCOUNTER — TRANSCRIPTION ENCOUNTER (OUTPATIENT)
Age: 34
End: 2019-08-29

## 2019-08-29 VITALS
HEART RATE: 87 BPM | DIASTOLIC BLOOD PRESSURE: 67 MMHG | SYSTOLIC BLOOD PRESSURE: 116 MMHG | RESPIRATION RATE: 18 BRPM | TEMPERATURE: 98 F

## 2019-08-29 DIAGNOSIS — Z90.49 ACQUIRED ABSENCE OF OTHER SPECIFIED PARTS OF DIGESTIVE TRACT: Chronic | ICD-10-CM

## 2019-08-29 DIAGNOSIS — Z33.2 ENCOUNTER FOR ELECTIVE TERMINATION OF PREGNANCY: Chronic | ICD-10-CM

## 2019-08-29 DIAGNOSIS — Z98.890 OTHER SPECIFIED POSTPROCEDURAL STATES: Chronic | ICD-10-CM

## 2019-08-29 DIAGNOSIS — Z3A.00 WEEKS OF GESTATION OF PREGNANCY NOT SPECIFIED: ICD-10-CM

## 2019-08-29 DIAGNOSIS — Z98.891 HISTORY OF UTERINE SCAR FROM PREVIOUS SURGERY: Chronic | ICD-10-CM

## 2019-08-29 DIAGNOSIS — G56.00 CARPAL TUNNEL SYNDROME, UNSPECIFIED UPPER LIMB: Chronic | ICD-10-CM

## 2019-08-29 DIAGNOSIS — O26.899 OTHER SPECIFIED PREGNANCY RELATED CONDITIONS, UNSPECIFIED TRIMESTER: ICD-10-CM

## 2019-08-30 ENCOUNTER — RESULT REVIEW (OUTPATIENT)
Age: 34
End: 2019-08-30

## 2019-08-30 LAB
BASOPHILS # BLD AUTO: 0.01 K/UL — SIGNIFICANT CHANGE UP (ref 0–0.2)
BASOPHILS NFR BLD AUTO: 0.1 % — SIGNIFICANT CHANGE UP (ref 0–2)
BLD GP AB SCN SERPL QL: NEGATIVE — SIGNIFICANT CHANGE UP
EOSINOPHIL # BLD AUTO: 0.07 K/UL — SIGNIFICANT CHANGE UP (ref 0–0.5)
EOSINOPHIL NFR BLD AUTO: 0.8 % — SIGNIFICANT CHANGE UP (ref 0–6)
HBV SURFACE AG SER-ACNC: NEGATIVE — SIGNIFICANT CHANGE UP
HCT VFR BLD CALC: 41.9 % — SIGNIFICANT CHANGE UP (ref 34.5–45)
HGB BLD-MCNC: 13 G/DL — SIGNIFICANT CHANGE UP (ref 11.5–15.5)
HIV COMBO RESULT: SIGNIFICANT CHANGE UP
HIV1+2 AB SPEC QL: SIGNIFICANT CHANGE UP
IMM GRANULOCYTES NFR BLD AUTO: 0.6 % — SIGNIFICANT CHANGE UP (ref 0–1.5)
LYMPHOCYTES # BLD AUTO: 1.9 K/UL — SIGNIFICANT CHANGE UP (ref 1–3.3)
LYMPHOCYTES # BLD AUTO: 21.6 % — SIGNIFICANT CHANGE UP (ref 13–44)
MCHC RBC-ENTMCNC: 24.5 PG — LOW (ref 27–34)
MCHC RBC-ENTMCNC: 31 % — LOW (ref 32–36)
MCV RBC AUTO: 79.1 FL — LOW (ref 80–100)
MONOCYTES # BLD AUTO: 0.81 K/UL — SIGNIFICANT CHANGE UP (ref 0–0.9)
MONOCYTES NFR BLD AUTO: 9.2 % — SIGNIFICANT CHANGE UP (ref 2–14)
NEUTROPHILS # BLD AUTO: 5.96 K/UL — SIGNIFICANT CHANGE UP (ref 1.8–7.4)
NEUTROPHILS NFR BLD AUTO: 67.7 % — SIGNIFICANT CHANGE UP (ref 43–77)
NRBC # FLD: 0 K/UL — SIGNIFICANT CHANGE UP (ref 0–0)
PLATELET # BLD AUTO: 280 K/UL — SIGNIFICANT CHANGE UP (ref 150–400)
PMV BLD: 9.3 FL — SIGNIFICANT CHANGE UP (ref 7–13)
RBC # BLD: 5.3 M/UL — HIGH (ref 3.8–5.2)
RBC # FLD: 15.5 % — HIGH (ref 10.3–14.5)
RH IG SCN BLD-IMP: POSITIVE — SIGNIFICANT CHANGE UP
RH IG SCN BLD-IMP: POSITIVE — SIGNIFICANT CHANGE UP
WBC # BLD: 8.8 K/UL — SIGNIFICANT CHANGE UP (ref 3.8–10.5)
WBC # FLD AUTO: 8.8 K/UL — SIGNIFICANT CHANGE UP (ref 3.8–10.5)

## 2019-08-30 PROCEDURE — 88302 TISSUE EXAM BY PATHOLOGIST: CPT | Mod: 26

## 2019-08-30 RX ORDER — KETOROLAC TROMETHAMINE 30 MG/ML
30 SYRINGE (ML) INJECTION EVERY 6 HOURS
Refills: 0 | Status: DISCONTINUED | OUTPATIENT
Start: 2019-08-30 | End: 2019-08-31

## 2019-08-30 RX ORDER — DOCUSATE SODIUM 100 MG
100 CAPSULE ORAL
Refills: 0 | Status: DISCONTINUED | OUTPATIENT
Start: 2019-08-30 | End: 2019-09-03

## 2019-08-30 RX ORDER — SODIUM CHLORIDE 9 MG/ML
1000 INJECTION, SOLUTION INTRAVENOUS
Refills: 0 | Status: DISCONTINUED | OUTPATIENT
Start: 2019-08-30 | End: 2019-08-30

## 2019-08-30 RX ORDER — HEPARIN SODIUM 5000 [USP'U]/ML
5000 INJECTION INTRAVENOUS; SUBCUTANEOUS EVERY 12 HOURS
Refills: 0 | Status: DISCONTINUED | OUTPATIENT
Start: 2019-08-30 | End: 2019-09-03

## 2019-08-30 RX ORDER — OXYTOCIN 10 UNIT/ML
333.33 VIAL (ML) INJECTION
Qty: 20 | Refills: 0 | Status: DISCONTINUED | OUTPATIENT
Start: 2019-08-30 | End: 2019-08-30

## 2019-08-30 RX ORDER — TETANUS TOXOID, REDUCED DIPHTHERIA TOXOID AND ACELLULAR PERTUSSIS VACCINE, ADSORBED 5; 2.5; 8; 8; 2.5 [IU]/.5ML; [IU]/.5ML; UG/.5ML; UG/.5ML; UG/.5ML
0.5 SUSPENSION INTRAMUSCULAR ONCE
Refills: 0 | Status: DISCONTINUED | OUTPATIENT
Start: 2019-08-30 | End: 2019-09-03

## 2019-08-30 RX ORDER — ONDANSETRON 8 MG/1
4 TABLET, FILM COATED ORAL EVERY 6 HOURS
Refills: 0 | Status: DISCONTINUED | OUTPATIENT
Start: 2019-08-30 | End: 2019-09-03

## 2019-08-30 RX ORDER — HEPARIN SODIUM 5000 [USP'U]/ML
5000 INJECTION INTRAVENOUS; SUBCUTANEOUS EVERY 12 HOURS
Refills: 0 | Status: DISCONTINUED | OUTPATIENT
Start: 2019-08-30 | End: 2019-08-30

## 2019-08-30 RX ORDER — GLYCERIN ADULT
1 SUPPOSITORY, RECTAL RECTAL AT BEDTIME
Refills: 0 | Status: DISCONTINUED | OUTPATIENT
Start: 2019-08-30 | End: 2019-09-03

## 2019-08-30 RX ORDER — ACETAMINOPHEN 500 MG
975 TABLET ORAL
Refills: 0 | Status: DISCONTINUED | OUTPATIENT
Start: 2019-08-30 | End: 2019-09-03

## 2019-08-30 RX ORDER — MAGNESIUM HYDROXIDE 400 MG/1
30 TABLET, CHEWABLE ORAL
Refills: 0 | Status: DISCONTINUED | OUTPATIENT
Start: 2019-08-30 | End: 2019-09-03

## 2019-08-30 RX ORDER — CITRIC ACID/SODIUM CITRATE 300-500 MG
30 SOLUTION, ORAL ORAL ONCE
Refills: 0 | Status: COMPLETED | OUTPATIENT
Start: 2019-08-30 | End: 2019-08-30

## 2019-08-30 RX ORDER — OXYCODONE HYDROCHLORIDE 5 MG/1
5 TABLET ORAL
Refills: 0 | Status: COMPLETED | OUTPATIENT
Start: 2019-08-30 | End: 2019-09-06

## 2019-08-30 RX ORDER — OXYCODONE HYDROCHLORIDE 5 MG/1
5 TABLET ORAL ONCE
Refills: 0 | Status: DISCONTINUED | OUTPATIENT
Start: 2019-08-30 | End: 2019-09-03

## 2019-08-30 RX ORDER — DIPHENHYDRAMINE HCL 50 MG
25 CAPSULE ORAL EVERY 6 HOURS
Refills: 0 | Status: DISCONTINUED | OUTPATIENT
Start: 2019-08-30 | End: 2019-09-03

## 2019-08-30 RX ORDER — SIMETHICONE 80 MG/1
80 TABLET, CHEWABLE ORAL EVERY 4 HOURS
Refills: 0 | Status: DISCONTINUED | OUTPATIENT
Start: 2019-08-30 | End: 2019-09-03

## 2019-08-30 RX ORDER — SODIUM CHLORIDE 9 MG/ML
1000 INJECTION, SOLUTION INTRAVENOUS ONCE
Refills: 0 | Status: COMPLETED | OUTPATIENT
Start: 2019-08-30 | End: 2019-08-30

## 2019-08-30 RX ORDER — HYDROMORPHONE HYDROCHLORIDE 2 MG/ML
1 INJECTION INTRAMUSCULAR; INTRAVENOUS; SUBCUTANEOUS
Refills: 0 | Status: DISCONTINUED | OUTPATIENT
Start: 2019-08-30 | End: 2019-08-30

## 2019-08-30 RX ORDER — LANOLIN
1 OINTMENT (GRAM) TOPICAL EVERY 6 HOURS
Refills: 0 | Status: DISCONTINUED | OUTPATIENT
Start: 2019-08-30 | End: 2019-09-03

## 2019-08-30 RX ORDER — METOCLOPRAMIDE HCL 10 MG
10 TABLET ORAL ONCE
Refills: 0 | Status: COMPLETED | OUTPATIENT
Start: 2019-08-30 | End: 2019-08-30

## 2019-08-30 RX ORDER — FAMOTIDINE 10 MG/ML
20 INJECTION INTRAVENOUS ONCE
Refills: 0 | Status: COMPLETED | OUTPATIENT
Start: 2019-08-30 | End: 2019-08-30

## 2019-08-30 RX ORDER — IBUPROFEN 200 MG
600 TABLET ORAL EVERY 6 HOURS
Refills: 0 | Status: COMPLETED | OUTPATIENT
Start: 2019-08-30 | End: 2020-07-28

## 2019-08-30 RX ADMIN — SODIUM CHLORIDE 2000 MILLILITER(S): 9 INJECTION, SOLUTION INTRAVENOUS at 04:03

## 2019-08-30 RX ADMIN — Medication 1000 MILLIUNIT(S)/MIN: at 08:40

## 2019-08-30 RX ADMIN — SODIUM CHLORIDE 75 MILLILITER(S): 9 INJECTION, SOLUTION INTRAVENOUS at 08:40

## 2019-08-30 RX ADMIN — Medication 30 MILLIGRAM(S): at 14:15

## 2019-08-30 RX ADMIN — SODIUM CHLORIDE 125 MILLILITER(S): 9 INJECTION, SOLUTION INTRAVENOUS at 04:32

## 2019-08-30 RX ADMIN — Medication 30 MILLIGRAM(S): at 22:21

## 2019-08-30 RX ADMIN — Medication 30 MILLIGRAM(S): at 14:30

## 2019-08-30 RX ADMIN — HEPARIN SODIUM 5000 UNIT(S): 5000 INJECTION INTRAVENOUS; SUBCUTANEOUS at 14:14

## 2019-08-30 RX ADMIN — Medication 10 MILLIGRAM(S): at 04:33

## 2019-08-30 RX ADMIN — Medication 30 MILLILITER(S): at 04:42

## 2019-08-30 RX ADMIN — FAMOTIDINE 20 MILLIGRAM(S): 10 INJECTION INTRAVENOUS at 04:33

## 2019-08-30 RX ADMIN — Medication 30 MILLIGRAM(S): at 23:21

## 2019-08-30 NOTE — OB PROVIDER TRIAGE NOTE - NSHPPHYSICALEXAM_GEN_ALL_CORE
Vital Signs Last 24 Hrs  T(C): 36.4 (30 Aug 2019 00:12), Max: 36.8 (30 Aug 2019 00:01)  T(F): 97.52 (30 Aug 2019 00:12), Max: 98.24 (30 Aug 2019 00:01)  HR: 86 (30 Aug 2019 00:12) (86 - 87)  BP: 116/67 (30 Aug 2019 00:12) (116/67 - 116/67)  BP(mean): --  RR: 17 (30 Aug 2019 00:12) (17 - 18)  SpO2: --    abdomen soft nontender gravid  fhr 135 moderate variability with accelerations  irregular uterine contractions noted on tocometer    VE: closed; posterior    Cephalic

## 2019-08-30 NOTE — OB RN DELIVERY SUMMARY - NS_SEPSISRSKCALC_OBGYN_ALL_OB_FT
No temperature has been documented for this patient in CPN or on the OB Flowsheet. Ensure the highest temperature during labor was documented on the OB Flowsheet.  No gestational age at birth has been documented. Ensure delivery date/time has been entered above.  Rupture of membranes must be entered above. Rupture of membranes must be entered above. EOS calculated successfully. EOS Risk Factor: 0.5/1000 live births ([facility] national incidence); GA=38w;Temp=98.42; ROM=0; GBS='Negative'; Antibiotics='No antibiotics or any antibiotics < 2 hrs prior to birth'

## 2019-08-30 NOTE — BRIEF OPERATIVE NOTE - NSICDXBRIEFPROCEDURE_GEN_ALL_CORE_FT
PROCEDURES:   section, classical, repeat, with bilateral tubal ligation 30-Aug-2019 08:31:23  Richa Rayogza

## 2019-08-30 NOTE — BRIEF OPERATIVE NOTE - OPERATION/FINDINGS
adhesions from left lower uterine serosa to midline anterior abdominal wall  adhesions from omentum to anterior abdominal wall  classical  section performed secondary to adhesions distorting uterine anatomy  viable male infant, vertex presentation, Apgars 9/9, cord gasses sent  hysterotomy closed in 2 layers  Grossly normal fallopian tubes, uterus, and ovaries  bilateral partial salpingectomy performed

## 2019-08-30 NOTE — OB PROVIDER H&P - ASSESSMENT
pmh: carpal tunnel   psh: laparoscopic cholecystectomy 2005  laparoscopic myomectomy   primary c/s   obhx: Primary c/s  PPROM 32w; NRFHT, 4lbs 7oz  TOP w/ D&C 2010  SAB complete x2   gynhx: myomas  abnormal pap; LEEP 14 years ago    Allergies: Augmentin; thrush    Medications: PNV    Vital Signs Last 24 Hrs  T(C): 36.4 (30 Aug 2019 00:12), Max: 36.8 (30 Aug 2019 00:01)  T(F): 97.52 (30 Aug 2019 00:12), Max: 98.24 (30 Aug 2019 00:01)  HR: 86 (30 Aug 2019 00:12) (86 - 87)  BP: 116/67 (30 Aug 2019 00:12) (116/67 - 116/67)  BP(mean): --  RR: 17 (30 Aug 2019 00:12) (17 - 18)  SpO2: --    abdomen soft nontender gravid  fhr 135 moderate variability with accelerations  irregular uterine contractions noted on tocometer    VE: closed; posterior    Cephalic    patient currently being followed in ATU for low huang A   hx of fall during this pregnancy; admission to  unit for monitoring    TABUS: Cephalic  BPP   RYLAN: 12.11cm    Cat 1 NST  ctx q5 minutes; patient reporting 8/10 pain    0230 patient reassessment and VE  noted to be unchanged; closed and posterior   ctx q 4-5 minutes  patient reports pain intensity remains the same    d/w Dr. Salomon;   patient to be admitted for repeat c/s with btl    Admit to Labor and Delivery for Repeat c/s with Bilateral Tubal Ligation for for labor at 38w  Routine Admission Labs  Pre-Operative labs ordered  Anesthesia Consultation  Patient NPO since 2019     D/w Dr. Salomon & Dr. Campoverde

## 2019-08-30 NOTE — OB RN PATIENT PROFILE - ALERT: PERTINENT HISTORY
20 Week Level II Sonogram/BioPhysical Profile(s)/1st Trimester Sonogram/Non Invasive Prenatal Screen (NIPS)/Ultra Screen at 12 Weeks

## 2019-08-30 NOTE — OB NEONATOLOGY/PEDIATRICIAN DELIVERY SUMMARY - NSPEDSNEONOTESA_OBGYN_ALL_OB_FT
Baby boy born at 38.0 wks via rpt CS to a 34yo  B+ mother. Maternal history of myomectomy, CS @ 32wks GA, anemia, and vitD deficiency (no meds). No significant prenatal history PNL sent on arrival, HbSAg neg and RPR nr, HIV and rubella pending. GBS neg on 8/15. ROM at delivery, no EOS calculated. Baby emerged vigorous and crying, was w/d/s/s with APGARs of 9/9. Mom would like to breast and bottle feed, consents to HBV, and requests circ.

## 2019-08-30 NOTE — OB PROVIDER DELIVERY SUMMARY - NSPROVIDERDELIVERYNOTE_OBGYN_ALL_OB_FT
adhesions from left lower uterine serosa to midline anterior abdominal wall  adhesions from omentum to anterior abdominal wall  classical  section performed secondary to adhesions distorting uterine anatomy  viable male infant, vertex presentation, Apgars 9/9, cord gasses sent  hysterotomy closed in 2 layers  Grossly normal fallopian tubes, uterus, and ovaries  bilateral partial salpingectomy performed    EBL: 100  IVF: 2500  UOP: 400    C. Jaret PGY2  w/ Dr. Salomon & Hernan

## 2019-08-30 NOTE — OB PROVIDER TRIAGE NOTE - NSOBPROVIDERNOTE_OBGYN_ALL_OB_FT
pmh: carpal tunnel   psh: laparoscopic cholecystectomy 2005  myomectomy 2014  primary c/s 2015  obhx: Primary c/s 2015 PPROM 32w; NRFHT, 4lbs 7oz  TOP w/ D&C 2010  SAB complete x2   gynhx: myoma  abnormal pap; LEEP 14 years ago    Allergies: Augmentin; thrush    Medications: PNV    Vital Signs Last 24 Hrs  T(C): 36.4 (30 Aug 2019 00:12), Max: 36.8 (30 Aug 2019 00:01)  T(F): 97.52 (30 Aug 2019 00:12), Max: 98.24 (30 Aug 2019 00:01)  HR: 86 (30 Aug 2019 00:12) (86 - 87)  BP: 116/67 (30 Aug 2019 00:12) (116/67 - 116/67)  BP(mean): --  RR: 17 (30 Aug 2019 00:12) (17 - 18)  SpO2: --    abdomen soft nontender gravid  fhr 135 moderate variability with accelerations  irregular uterine contractions noted on tocometer    VE: closed; posterior    Cephalic pmh: carpal tunnel   psh: laparoscopic cholecystectomy 2005  myomectomy 2014  primary c/s 2015  obhx: Primary c/s 2015 PPROM 32w; NRFHT, 4lbs 7oz  TOP w/ D&C 2010  SAB complete x2   gynhx: myoma  abnormal pap; LEEP 14 years ago    Allergies: Augmentin; thrush    Medications: PNV    Vital Signs Last 24 Hrs  T(C): 36.4 (30 Aug 2019 00:12), Max: 36.8 (30 Aug 2019 00:01)  T(F): 97.52 (30 Aug 2019 00:12), Max: 98.24 (30 Aug 2019 00:01)  HR: 86 (30 Aug 2019 00:12) (86 - 87)  BP: 116/67 (30 Aug 2019 00:12) (116/67 - 116/67)  BP(mean): --  RR: 17 (30 Aug 2019 00:12) (17 - 18)  SpO2: --    abdomen soft nontender gravid  fhr 135 moderate variability with accelerations  irregular uterine contractions noted on tocometer    VE: closed; posterior    Cephalic    patient currently being followed in ATU for low huang A     TABUS: Cephalic  BPP 8/8  RYLAN: 12.11cm    Cat 1 NST pmh: carpal tunnel   psh: laparoscopic cholecystectomy 2005  laparoscopic myomectomy 2014  primary c/s 2015  obhx: Primary c/s 2015 PPROM 32w; NRFHT, 4lbs 7oz  TOP w/ D&C 2010  SAB complete x2   gynhx: myomas  abnormal pap; LEEP 14 years ago    Allergies: Augmentin; thrush    Medications: PNV    Vital Signs Last 24 Hrs  T(C): 36.4 (30 Aug 2019 00:12), Max: 36.8 (30 Aug 2019 00:01)  T(F): 97.52 (30 Aug 2019 00:12), Max: 98.24 (30 Aug 2019 00:01)  HR: 86 (30 Aug 2019 00:12) (86 - 87)  BP: 116/67 (30 Aug 2019 00:12) (116/67 - 116/67)  BP(mean): --  RR: 17 (30 Aug 2019 00:12) (17 - 18)  SpO2: --    abdomen soft nontender gravid  fhr 135 moderate variability with accelerations  irregular uterine contractions noted on tocometer    VE: closed; posterior    Cephalic    patient currently being followed in ATU for low huang A     TABUS: Cephalic  BPP 8/8  RYLAN: 12.11cm    Cat 1 NST  ctx q5 minutes; patient reporting 8/10 pain    0230 patient reassessment and VE  noted to be unchanged; closed and posterior   ctx q 4-5 minutes  patient reports pain intensity remains the same    d/w Dr. Salomon;   patient to be admitted for repeat c/s with btl

## 2019-08-30 NOTE — OB PROVIDER H&P - ALERT: PERTINENT HISTORY
1st Trimester Sonogram/20 Week Level II Sonogram/BioPhysical Profile(s)/Non Invasive Prenatal Screen (NIPS)/Ultra Screen at 12 Weeks

## 2019-08-30 NOTE — OB PROVIDER TRIAGE NOTE - HISTORY OF PRESENT ILLNESS
33y.o.  at 38w presenting with complaints of contractions and low back pain along with contractions beginning at 1700 today beginning to increase in intensity at 2000.  Patient reports contractions are now q3-4 minutes patient is presently reporting 8/10 pain.  Patient reports good fetal movement, denies lof, denies vaginal bleeding.      a/p course complications patient denies   patient for desires repeat c/s with btl

## 2019-08-31 LAB
BASOPHILS # BLD AUTO: 0.02 K/UL — SIGNIFICANT CHANGE UP (ref 0–0.2)
BASOPHILS NFR BLD AUTO: 0.2 % — SIGNIFICANT CHANGE UP (ref 0–2)
EOSINOPHIL # BLD AUTO: 0.04 K/UL — SIGNIFICANT CHANGE UP (ref 0–0.5)
EOSINOPHIL NFR BLD AUTO: 0.4 % — SIGNIFICANT CHANGE UP (ref 0–6)
HCT VFR BLD CALC: 32.6 % — LOW (ref 34.5–45)
HGB BLD-MCNC: 10.3 G/DL — LOW (ref 11.5–15.5)
IMM GRANULOCYTES NFR BLD AUTO: 0.7 % — SIGNIFICANT CHANGE UP (ref 0–1.5)
LYMPHOCYTES # BLD AUTO: 1.3 K/UL — SIGNIFICANT CHANGE UP (ref 1–3.3)
LYMPHOCYTES # BLD AUTO: 12.2 % — LOW (ref 13–44)
MCHC RBC-ENTMCNC: 24.5 PG — LOW (ref 27–34)
MCHC RBC-ENTMCNC: 31.6 % — LOW (ref 32–36)
MCV RBC AUTO: 77.4 FL — LOW (ref 80–100)
MONOCYTES # BLD AUTO: 0.98 K/UL — HIGH (ref 0–0.9)
MONOCYTES NFR BLD AUTO: 9.2 % — SIGNIFICANT CHANGE UP (ref 2–14)
NEUTROPHILS # BLD AUTO: 8.28 K/UL — HIGH (ref 1.8–7.4)
NEUTROPHILS NFR BLD AUTO: 77.3 % — HIGH (ref 43–77)
NRBC # FLD: 0 K/UL — SIGNIFICANT CHANGE UP (ref 0–0)
PLATELET # BLD AUTO: 236 K/UL — SIGNIFICANT CHANGE UP (ref 150–400)
PMV BLD: 9.5 FL — SIGNIFICANT CHANGE UP (ref 7–13)
RBC # BLD: 4.21 M/UL — SIGNIFICANT CHANGE UP (ref 3.8–5.2)
RBC # FLD: 15.4 % — HIGH (ref 10.3–14.5)
T PALLIDUM AB TITR SER: NEGATIVE — SIGNIFICANT CHANGE UP
WBC # BLD: 10.69 K/UL — HIGH (ref 3.8–10.5)
WBC # FLD AUTO: 10.69 K/UL — HIGH (ref 3.8–10.5)

## 2019-08-31 RX ORDER — IBUPROFEN 200 MG
600 TABLET ORAL EVERY 6 HOURS
Refills: 0 | Status: DISCONTINUED | OUTPATIENT
Start: 2019-08-31 | End: 2019-09-03

## 2019-08-31 RX ADMIN — Medication 30 MILLIGRAM(S): at 05:04

## 2019-08-31 RX ADMIN — SIMETHICONE 80 MILLIGRAM(S): 80 TABLET, CHEWABLE ORAL at 12:17

## 2019-08-31 RX ADMIN — Medication 600 MILLIGRAM(S): at 18:49

## 2019-08-31 RX ADMIN — Medication 600 MILLIGRAM(S): at 19:11

## 2019-08-31 RX ADMIN — Medication 30 MILLIGRAM(S): at 12:04

## 2019-08-31 RX ADMIN — Medication 30 MILLIGRAM(S): at 13:00

## 2019-08-31 RX ADMIN — Medication 975 MILLIGRAM(S): at 00:45

## 2019-08-31 RX ADMIN — Medication 975 MILLIGRAM(S): at 01:45

## 2019-08-31 RX ADMIN — HEPARIN SODIUM 5000 UNIT(S): 5000 INJECTION INTRAVENOUS; SUBCUTANEOUS at 02:21

## 2019-08-31 RX ADMIN — Medication 30 MILLIGRAM(S): at 06:04

## 2019-08-31 RX ADMIN — HEPARIN SODIUM 5000 UNIT(S): 5000 INJECTION INTRAVENOUS; SUBCUTANEOUS at 18:48

## 2019-08-31 NOTE — PROGRESS NOTE ADULT - ASSESSMENT
A/P: 32yo with h/o myomectomy now POD#1 s/p rCCS due to PPROM @ 32wks. Patient is stable and doing well post-operatively.

## 2019-08-31 NOTE — PROGRESS NOTE ADULT - SUBJECTIVE AND OBJECTIVE BOX
ANESTHESIA POSTOP CHECK    33y Female POSTOP DAY 1 S/P  with BTL    Vital Signs Last 24 Hrs  T(C): 36.9 (31 Aug 2019 06:31), Max: 37.1 (30 Aug 2019 14:48)  T(F): 98.5 (31 Aug 2019 06:31), Max: 98.7 (30 Aug 2019 14:48)  HR: 85 (31 Aug 2019 06:31) (72 - 91)  BP: 93/52 (31 Aug 2019 06:31) (93/52 - 111/64)  BP(mean): --  RR: 18 (31 Aug 2019 06:31) (16 - 20)  SpO2: 97% (31 Aug 2019 06:31) (97% - 100%)  I&O's Summary    30 Aug 2019 07:01  -  31 Aug 2019 07:00  --------------------------------------------------------  IN: 3625 mL / OUT: 3340 mL / NET: 285 mL        [X ] NO APPARENT ANESTHESIA COMPLICATIONS      Comments:

## 2019-08-31 NOTE — PROGRESS NOTE ADULT - SUBJECTIVE AND OBJECTIVE BOX
OB Progress Note:  Delivery, POD#1    S: 32yo with h/o myomectomy now POD#1 s/p rCCS. Her pain is well controlled. She is tolerating a regular diet. No flatus yet. Denies N/V. Denies CP/SOB/lightheadedness/dizziness. Endorses light vaginal bleeding, less than one pad per hour. She is ambulating without difficulty. Voiding spontaneously.     O:   Vital Signs Last 24 Hrs  T(C): 36.9 (31 Aug 2019 06:31), Max: 37.1 (30 Aug 2019 14:48)  T(F): 98.5 (31 Aug 2019 06:31), Max: 98.7 (30 Aug 2019 14:48)  HR: 85 (31 Aug 2019 06:) (80 - 91)  BP: 93/52 (31 Aug 2019 06:31) (93/52 - 102/56)  BP(mean): --  RR: 18 (31 Aug 2019 06:) (18 - 20)  SpO2: 97% (31 Aug 2019 06:) (97% - 100%)    PE:  General: NAD  Heart: extremities well-perfused  Lungs: breathing comfortably  Abdomen: Mildly distended, appropriately tender, fundus firm, dressing removed, incision c/d/i   Extremities: No erythema, no pitting edema    Labs:  Blood type: B Positive  Rubella IgG: RPR: Negative                          10.3<L>   10.69<H> >-----------< 236    (  @ 06:00 )             32.6<L>                        13.0   8.80 >-----------< 280    (  @ 03:30 )             41.9

## 2019-08-31 NOTE — PROGRESS NOTE ADULT - SUBJECTIVE AND OBJECTIVE BOX
Postpartum Note,  Section  She is a  33y woman who is now post-operative day: 1    Subjective:  The patient feels well.  She is ambulating.   She is tolerating regular diet.  She denies nausea and vomiting.  She is voiding.  Her pain is controlled.  She reports normal postpartum bleeding.  She is breastfeeding.  She is formula feeding.    Physical exam:    Vital Signs Last 24 Hrs  T(C): 36.9 (31 Aug 2019 06:31), Max: 37.1 (30 Aug 2019 14:48)  T(F): 98.5 (31 Aug 2019 06:31), Max: 98.7 (30 Aug 2019 14:48)  HR: 85 (31 Aug 2019 06:31) (72 - 91)  BP: 93/52 (31 Aug 2019 06:31) (93/52 - 111/64)  BP(mean): --  RR: 18 (31 Aug 2019 06:31) (16 - 20)  SpO2: 97% (31 Aug 2019 06:31) (97% - 100%)    Gen: NAD  Breast: Soft, nontender, not engorged.  Abdomen: Soft, nontender, no distension , firm uterine fundus at umbilicus.  Incision: Clean, dry, and intact with steri strips  Pelvic: Normal lochia noted  Ext: No calf tenderness    LABS:                        10.3   10.69 )-----------( 236      ( 31 Aug 2019 06:00 )             32.6           Allergies    Augmentin (Anaphylaxis; Urticaria)    MEDICATIONS  (STANDING):  acetaminophen   Tablet .. 975 milliGRAM(s) Oral <User Schedule>  diphtheria/tetanus/pertussis (acellular) Vaccine (ADAcel) 0.5 milliLiter(s) IntraMuscular once  heparin  Injectable 5000 Unit(s) SubCutaneous every 12 hours  ibuprofen  Tablet. 600 milliGRAM(s) Oral every 6 hours  ketorolac   Injectable 30 milliGRAM(s) IV Push every 6 hours    MEDICATIONS  (PRN):  diphenhydrAMINE 25 milliGRAM(s) Oral every 6 hours PRN Itching  docusate sodium 100 milliGRAM(s) Oral two times a day PRN Stool softening  glycerin Suppository - Adult 1 Suppository(s) Rectal at bedtime PRN Constipation  lanolin Ointment 1 Application(s) Topical every 6 hours PRN Sore Nipples  magnesium hydroxide Suspension 30 milliLiter(s) Oral two times a day PRN Constipation  ondansetron Injectable 4 milliGRAM(s) IV Push every 6 hours PRN Nausea  oxyCODONE    IR 5 milliGRAM(s) Oral every 3 hours PRN Moderate to Severe Pain (4-10)  oxyCODONE    IR 5 milliGRAM(s) Oral once PRN Moderate to Severe Pain (4-10)  simethicone 80 milliGRAM(s) Chew every 4 hours PRN Gas        Assessment and Plan  POD #1 s/p  section  Doing well.  Encourage ambulation.  LINSEY Bueno

## 2019-08-31 NOTE — PROGRESS NOTE ADULT - PROBLEM SELECTOR PLAN 1
- Continue regular diet.  - Increase ambulation.  - Continue motrin, tylenol, oxycodone PRN for pain control.     Essie Holloway, PGY-1

## 2019-08-31 NOTE — PROGRESS NOTE ADULT - SUBJECTIVE AND OBJECTIVE BOX
Postop Day  __1_ s/p   C- Section    THERAPY:  [X] Spinal morphine   [  ] Epidural morphine   [  ] IV PCA Hydromorphone 1 mg/ml      Sedation Score:	  [X] Alert	    [  ] Drowsy        [  ] Arousable	[  ] Asleep	[  ] Unresponsive    Side Effects:	  [X] None	     [  ] Nausea        [  ] Pruritus        [  ] Weakness   [  ] Numbness        ASSESSMENT/ PLAN   [   ] Discontinue         [  ] Continue  [ x ]Documentation and Verification of current medications     Comments:

## 2019-09-01 RX ORDER — OXYCODONE HYDROCHLORIDE 5 MG/1
5 TABLET ORAL
Refills: 0 | Status: DISCONTINUED | OUTPATIENT
Start: 2019-09-01 | End: 2019-09-03

## 2019-09-01 RX ADMIN — Medication 975 MILLIGRAM(S): at 09:30

## 2019-09-01 RX ADMIN — HEPARIN SODIUM 5000 UNIT(S): 5000 INJECTION INTRAVENOUS; SUBCUTANEOUS at 06:34

## 2019-09-01 RX ADMIN — Medication 600 MILLIGRAM(S): at 07:21

## 2019-09-01 RX ADMIN — Medication 600 MILLIGRAM(S): at 01:10

## 2019-09-01 RX ADMIN — HEPARIN SODIUM 5000 UNIT(S): 5000 INJECTION INTRAVENOUS; SUBCUTANEOUS at 17:58

## 2019-09-01 RX ADMIN — Medication 600 MILLIGRAM(S): at 12:21

## 2019-09-01 RX ADMIN — Medication 975 MILLIGRAM(S): at 08:42

## 2019-09-01 RX ADMIN — Medication 600 MILLIGRAM(S): at 00:21

## 2019-09-01 RX ADMIN — Medication 600 MILLIGRAM(S): at 17:58

## 2019-09-01 RX ADMIN — Medication 600 MILLIGRAM(S): at 06:35

## 2019-09-01 RX ADMIN — Medication 100 MILLIGRAM(S): at 08:42

## 2019-09-01 RX ADMIN — Medication 600 MILLIGRAM(S): at 14:04

## 2019-09-01 RX ADMIN — Medication 600 MILLIGRAM(S): at 18:31

## 2019-09-01 NOTE — PROGRESS NOTE ADULT - PROBLEM SELECTOR PLAN 1
- Continue regular diet.  - Increase ambulation.  - Continue motrin, tylenol, oxycodone PRN for pain control    Richa Raygoza, PGY2

## 2019-09-01 NOTE — PROGRESS NOTE ADULT - SUBJECTIVE AND OBJECTIVE BOX
OB Progress Note: CCS, POD#2    S: 32yo POD#2 s/p CCS. Pain is well controlled. She is tolerating a regular diet and passing flatus. She is voiding spontaneously, and ambulating without difficulty. Denies CP/SOB. Denies lightheadedness/dizziness. Denies N/V.    O:  Vitals:  Vital Signs Last 24 Hrs  T(C): 36.7 (31 Aug 2019 17:35), Max: 36.9 (31 Aug 2019 06:31)  T(F): 98.1 (31 Aug 2019 17:35), Max: 98.5 (31 Aug 2019 06:31)  HR: 103 (31 Aug 2019 17:35) (85 - 103)  BP: 103/62 (31 Aug 2019 17:35) (93/52 - 103/62)  BP(mean): --  RR: 18 (31 Aug 2019 17:35) (18 - 18)  SpO2: 100% (31 Aug 2019 17:35) (97% - 100%)    MEDICATIONS  (STANDING):  acetaminophen   Tablet .. 975 milliGRAM(s) Oral <User Schedule>  diphtheria/tetanus/pertussis (acellular) Vaccine (ADAcel) 0.5 milliLiter(s) IntraMuscular once  heparin  Injectable 5000 Unit(s) SubCutaneous every 12 hours  ibuprofen  Tablet. 600 milliGRAM(s) Oral every 6 hours      MEDICATIONS  (PRN):  diphenhydrAMINE 25 milliGRAM(s) Oral every 6 hours PRN Itching  docusate sodium 100 milliGRAM(s) Oral two times a day PRN Stool softening  glycerin Suppository - Adult 1 Suppository(s) Rectal at bedtime PRN Constipation  lanolin Ointment 1 Application(s) Topical every 6 hours PRN Sore Nipples  magnesium hydroxide Suspension 30 milliLiter(s) Oral two times a day PRN Constipation  ondansetron Injectable 4 milliGRAM(s) IV Push every 6 hours PRN Nausea  oxyCODONE    IR 5 milliGRAM(s) Oral every 3 hours PRN Moderate to Severe Pain (4-10)  oxyCODONE    IR 5 milliGRAM(s) Oral once PRN Moderate to Severe Pain (4-10)  simethicone 80 milliGRAM(s) Chew every 4 hours PRN Gas      Labs:  Blood type: B Positive  Rubella IgG: RPR: Negative                          10.3<L>   10.69<H> >-----------< 236    ( 08-31 @ 06:00 )             32.6<L>                        13.0   8.80 >-----------< 280    ( 08-30 @ 03:30 )             41.9                  PE:  General: NAD  Abdomen: Soft, appropriately tender, incision c/d/i.  Extremities: No erythema, no pitting edema

## 2019-09-02 ENCOUNTER — TRANSCRIPTION ENCOUNTER (OUTPATIENT)
Age: 34
End: 2019-09-02

## 2019-09-02 RX ORDER — IBUPROFEN 200 MG
1 TABLET ORAL
Qty: 0 | Refills: 0 | DISCHARGE
Start: 2019-09-02

## 2019-09-02 RX ORDER — ACETAMINOPHEN 500 MG
3 TABLET ORAL
Qty: 0 | Refills: 0 | DISCHARGE
Start: 2019-09-02

## 2019-09-02 RX ADMIN — Medication 975 MILLIGRAM(S): at 18:09

## 2019-09-02 RX ADMIN — SIMETHICONE 80 MILLIGRAM(S): 80 TABLET, CHEWABLE ORAL at 13:22

## 2019-09-02 RX ADMIN — Medication 600 MILLIGRAM(S): at 13:22

## 2019-09-02 RX ADMIN — OXYCODONE HYDROCHLORIDE 5 MILLIGRAM(S): 5 TABLET ORAL at 00:08

## 2019-09-02 RX ADMIN — HEPARIN SODIUM 5000 UNIT(S): 5000 INJECTION INTRAVENOUS; SUBCUTANEOUS at 18:08

## 2019-09-02 RX ADMIN — Medication 600 MILLIGRAM(S): at 13:40

## 2019-09-02 RX ADMIN — HEPARIN SODIUM 5000 UNIT(S): 5000 INJECTION INTRAVENOUS; SUBCUTANEOUS at 06:50

## 2019-09-02 RX ADMIN — Medication 600 MILLIGRAM(S): at 06:50

## 2019-09-02 RX ADMIN — Medication 600 MILLIGRAM(S): at 07:13

## 2019-09-02 NOTE — PROGRESS NOTE ADULT - ATTENDING COMMENTS
Patient seen and agree with above  Continue post op care  Increase ambulation  LINSEY Bueno
Patient seen and agree with above  post RCS 8/30, stable  PLAN  continue post op care  discharge 9/3/19  LINSEY Bueno

## 2019-09-02 NOTE — DISCHARGE NOTE OB - CARE PLAN
Principal Discharge DX:	 delivery delivered  Goal:	recovery  Assessment and plan of treatment:	return visit x 2 weeks, pelvic rest, regular diet

## 2019-09-02 NOTE — DISCHARGE NOTE OB - MEDICATION SUMMARY - MEDICATIONS TO TAKE
I will START or STAY ON the medications listed below when I get home from the hospital:    acetaminophen 325 mg oral tablet  -- 3 tab(s) by mouth   -- Indication: For as needed for pain    ibuprofen 600 mg oral tablet  -- 1 tab(s) by mouth every 6 hours  -- Indication: For as needed for pain

## 2019-09-02 NOTE — DISCHARGE NOTE OB - MATERIALS PROVIDED
Breastfeeding Log/[place]  Immunization Record/Breastfeeding Guide and Packet/Breastfeeding Mother’s Support Group Information/Vaccinations/[facility] [place] Screening Program/[facility] Hearing Screen Program/Shaken Baby Prevention Handout/Guide to Postpartum Care/Birth Certificate Instructions/Discharge Medication Information for Patients and Families Pocket Guide/MMR Vaccination (VIS Pub Date: 2012)/Tdap Vaccination (VIS Pub Date: 2012)

## 2019-09-02 NOTE — PROGRESS NOTE ADULT - PROBLEM SELECTOR PLAN 1
- Continue regular diet.  - Increase ambulation.  - Continue motrin, tylenol, oxycodone PRN for pain control.    Cori Do, PGY-1 - Continue regular diet.  - Increase ambulation.  - Continue motrin, tylenol, oxycodone PRN for pain control.  - Discharge planning, patient likely to stay extra day due to baby in NICU    Croi Do, PGY-1

## 2019-09-02 NOTE — DISCHARGE NOTE OB - PATIENT PORTAL LINK FT
You can access the FollowMyHealth Patient Portal offered by  by registering at the following website: [email or web address]. By joining [employer]’s FollowMyHealth portal, you will also be able to view your health information using other applications (apps) compatible with our system.

## 2019-09-02 NOTE — PROGRESS NOTE ADULT - SUBJECTIVE AND OBJECTIVE BOX
OB Postpartum Note: Repeat Classical  Delivery, POD#2    S: 32yo POD#2 s/p rCCS. Her pain is well controlled. She is tolerating a regular diet and passing flatus. Voiding spontaneously. Denies N/V. Denies CP/SOB/lightheadedness/dizziness.     O:   Vitals:  Vital Signs Last 24 Hrs  T(C): 37.1 (01 Sep 2019 17:13), Max: 37.1 (01 Sep 2019 17:13)  T(F): 98.8 (01 Sep 2019 17:13), Max: 98.8 (01 Sep 2019 17:13)  HR: 101 (01 Sep 2019 17:13) (85 - 101)  BP: 120/68 (01 Sep 2019 17:13) (93/51 - 120/68)  BP(mean): --  RR: 18 (01 Sep 2019 17:13) (17 - 18)  SpO2: 97% (01 Sep 2019 17:13) (97% - 100%)    MEDICATIONS  (STANDING):  acetaminophen   Tablet .. 975 milliGRAM(s) Oral <User Schedule>  diphtheria/tetanus/pertussis (acellular) Vaccine (ADAcel) 0.5 milliLiter(s) IntraMuscular once  heparin  Injectable 5000 Unit(s) SubCutaneous every 12 hours  ibuprofen  Tablet. 600 milliGRAM(s) Oral every 6 hours    MEDICATIONS  (PRN):  diphenhydrAMINE 25 milliGRAM(s) Oral every 6 hours PRN Itching  docusate sodium 100 milliGRAM(s) Oral two times a day PRN Stool softening  glycerin Suppository - Adult 1 Suppository(s) Rectal at bedtime PRN Constipation  lanolin Ointment 1 Application(s) Topical every 6 hours PRN Sore Nipples  magnesium hydroxide Suspension 30 milliLiter(s) Oral two times a day PRN Constipation  ondansetron Injectable 4 milliGRAM(s) IV Push every 6 hours PRN Nausea  oxyCODONE    IR 5 milliGRAM(s) Oral once PRN Moderate to Severe Pain (4-10)  oxyCODONE    IR 5 milliGRAM(s) Oral every 3 hours PRN Moderate to Severe Pain (4-10)  simethicone 80 milliGRAM(s) Chew every 4 hours PRN Gas      Labs:  Blood type: B Positive  Rubella IgG: RPR: Negative                          10.3<L>   10.69<H> >-----------< 236    (  @ 06:00 )             32.6<L>                        13.0   8.80 >-----------< 280    (  @ 03:30 )             41.9    PE:  General: NAD  Abdomen: mildly distended,appropriately tender, incision c/d/i.  Extremities: SCDs in place, no erythema OB Postpartum Note: Repeat Classical  Delivery, POD#3    S: 34yo POD#2 s/p rCCS. Her pain is well controlled. She is tolerating a regular diet and passing flatus. Voiding spontaneously. Denies N/V. Denies CP/SOB/lightheadedness/dizziness.     O:   Vitals:  Vital Signs Last 24 Hrs  T(C): 37.1 (01 Sep 2019 17:13), Max: 37.1 (01 Sep 2019 17:13)  T(F): 98.8 (01 Sep 2019 17:13), Max: 98.8 (01 Sep 2019 17:13)  HR: 101 (01 Sep 2019 17:13) (85 - 101)  BP: 120/68 (01 Sep 2019 17:13) (93/51 - 120/68)  BP(mean): --  RR: 18 (01 Sep 2019 17:13) (17 - 18)  SpO2: 97% (01 Sep 2019 17:13) (97% - 100%)    MEDICATIONS  (STANDING):  acetaminophen   Tablet .. 975 milliGRAM(s) Oral <User Schedule>  diphtheria/tetanus/pertussis (acellular) Vaccine (ADAcel) 0.5 milliLiter(s) IntraMuscular once  heparin  Injectable 5000 Unit(s) SubCutaneous every 12 hours  ibuprofen  Tablet. 600 milliGRAM(s) Oral every 6 hours    MEDICATIONS  (PRN):  diphenhydrAMINE 25 milliGRAM(s) Oral every 6 hours PRN Itching  docusate sodium 100 milliGRAM(s) Oral two times a day PRN Stool softening  glycerin Suppository - Adult 1 Suppository(s) Rectal at bedtime PRN Constipation  lanolin Ointment 1 Application(s) Topical every 6 hours PRN Sore Nipples  magnesium hydroxide Suspension 30 milliLiter(s) Oral two times a day PRN Constipation  ondansetron Injectable 4 milliGRAM(s) IV Push every 6 hours PRN Nausea  oxyCODONE    IR 5 milliGRAM(s) Oral once PRN Moderate to Severe Pain (4-10)  oxyCODONE    IR 5 milliGRAM(s) Oral every 3 hours PRN Moderate to Severe Pain (4-10)  simethicone 80 milliGRAM(s) Chew every 4 hours PRN Gas      Labs:  Blood type: B Positive  Rubella IgG: RPR: Negative                          10.3<L>   10.69<H> >-----------< 236    (  @ 06:00 )             32.6<L>                        13.0   8.80 >-----------< 280    (  @ 03:30 )             41.9    PE:  General: NAD  Abdomen: mildly distended,appropriately tender, incision c/d/i.  Extremities: SCDs in place, no erythema

## 2019-09-02 NOTE — PROGRESS NOTE ADULT - ASSESSMENT
34 y/o  POD#2 s/p rCCS for hx of myomectomy, PNC c/b PROM at 32 weeks, doing well post operatively and stable. 34 y/o  POD#3 s/p rCCS for hx of myomectomy, PNC c/b PROM at 32 weeks, doing well post operatively and stable.

## 2019-09-03 ENCOUNTER — APPOINTMENT (OUTPATIENT)
Dept: ANTEPARTUM | Facility: CLINIC | Age: 34
End: 2019-09-03

## 2019-09-03 VITALS
HEART RATE: 76 BPM | TEMPERATURE: 99 F | OXYGEN SATURATION: 100 % | RESPIRATION RATE: 18 BRPM | SYSTOLIC BLOOD PRESSURE: 92 MMHG | DIASTOLIC BLOOD PRESSURE: 54 MMHG

## 2019-09-03 RX ORDER — INFLUENZA VIRUS VACCINE 15; 15; 15; 15 UG/.5ML; UG/.5ML; UG/.5ML; UG/.5ML
0.5 SUSPENSION INTRAMUSCULAR ONCE
Refills: 0 | Status: COMPLETED | OUTPATIENT
Start: 2019-09-03 | End: 2019-09-03

## 2019-09-03 RX ADMIN — Medication 600 MILLIGRAM(S): at 06:21

## 2019-09-03 RX ADMIN — Medication 600 MILLIGRAM(S): at 13:05

## 2019-09-03 RX ADMIN — HEPARIN SODIUM 5000 UNIT(S): 5000 INJECTION INTRAVENOUS; SUBCUTANEOUS at 06:22

## 2019-09-03 RX ADMIN — Medication 600 MILLIGRAM(S): at 13:40

## 2019-09-03 RX ADMIN — Medication 975 MILLIGRAM(S): at 09:10

## 2019-09-03 RX ADMIN — Medication 975 MILLIGRAM(S): at 08:34

## 2019-09-03 RX ADMIN — Medication 600 MILLIGRAM(S): at 06:54

## 2019-09-03 RX ADMIN — Medication 600 MILLIGRAM(S): at 00:54

## 2019-09-03 RX ADMIN — INFLUENZA VIRUS VACCINE 0.5 MILLILITER(S): 15; 15; 15; 15 SUSPENSION INTRAMUSCULAR at 13:06

## 2019-09-03 RX ADMIN — Medication 600 MILLIGRAM(S): at 00:20

## 2019-09-11 LAB — SURGICAL PATHOLOGY STUDY: SIGNIFICANT CHANGE UP

## 2019-09-17 ENCOUNTER — EMERGENCY (EMERGENCY)
Facility: HOSPITAL | Age: 34
LOS: 1 days | Discharge: HOME | End: 2019-09-17
Attending: PERSONAL EMERGENCY RESPONSE ATTENDANT | Admitting: EMERGENCY MEDICINE
Payer: COMMERCIAL

## 2019-09-17 VITALS
TEMPERATURE: 98 F | RESPIRATION RATE: 18 BRPM | HEART RATE: 93 BPM | SYSTOLIC BLOOD PRESSURE: 122 MMHG | DIASTOLIC BLOOD PRESSURE: 84 MMHG | OXYGEN SATURATION: 100 %

## 2019-09-17 DIAGNOSIS — Z98.891 HISTORY OF UTERINE SCAR FROM PREVIOUS SURGERY: Chronic | ICD-10-CM

## 2019-09-17 DIAGNOSIS — Z90.49 ACQUIRED ABSENCE OF OTHER SPECIFIED PARTS OF DIGESTIVE TRACT: Chronic | ICD-10-CM

## 2019-09-17 DIAGNOSIS — Z98.890 OTHER SPECIFIED POSTPROCEDURAL STATES: Chronic | ICD-10-CM

## 2019-09-17 DIAGNOSIS — Z33.2 ENCOUNTER FOR ELECTIVE TERMINATION OF PREGNANCY: Chronic | ICD-10-CM

## 2019-09-17 DIAGNOSIS — G56.00 CARPAL TUNNEL SYNDROME, UNSPECIFIED UPPER LIMB: Chronic | ICD-10-CM

## 2019-09-17 PROCEDURE — 99285 EMERGENCY DEPT VISIT HI MDM: CPT

## 2019-09-17 RX ORDER — SODIUM CHLORIDE 9 MG/ML
1000 INJECTION INTRAMUSCULAR; INTRAVENOUS; SUBCUTANEOUS ONCE
Refills: 0 | Status: COMPLETED | OUTPATIENT
Start: 2019-09-17 | End: 2019-09-17

## 2019-09-17 RX ORDER — MORPHINE SULFATE 50 MG/1
4 CAPSULE, EXTENDED RELEASE ORAL ONCE
Refills: 0 | Status: DISCONTINUED | OUTPATIENT
Start: 2019-09-17 | End: 2019-09-17

## 2019-09-17 RX ADMIN — MORPHINE SULFATE 4 MILLIGRAM(S): 50 CAPSULE, EXTENDED RELEASE ORAL at 23:55

## 2019-09-17 RX ADMIN — SODIUM CHLORIDE 1000 MILLILITER(S): 9 INJECTION INTRAMUSCULAR; INTRAVENOUS; SUBCUTANEOUS at 23:55

## 2019-09-17 NOTE — ED PROVIDER NOTE - NS ED ROS FT
GENERAL: No fever or chills, EYES: no change in vision, HEENT: no trouble swallowing or speaking, CARDIAC: no chest pain, PULMONARY: no cough or SOB, GI: +abdominal pain, +N/V, no diarrhea or constipation, : No changes in urination, SKIN: no rashes, NEURO: no headache,  MSK: No joint pain ~Ron Araujo M.D. Resident

## 2019-09-17 NOTE — ED PROVIDER NOTE - OBJECTIVE STATEMENT
32 yo F PMHx C/S 8/30 p/w RLQ abd pain x 2 days. Pain started yesterday and gradually worsened today. No fevers/chills, +nausea/vomiting (one episode today). She has been having vaginal spotting since the delivery. Abdominal incision healing well without s/sx of infection. She is breastfeeding at home. She has a hx of cholecystectomy and tubal ligation during this C/S.

## 2019-09-17 NOTE — ED PROVIDER NOTE - PHYSICAL EXAMINATION
Gen: AAOx3, non-toxic  Head: NCAT  HEENT: EOMI, oral mucosa moist, normal conjunctiva  Lung: CTAB, no respiratory distress, no wheezes/rhonchi/rales B/L, speaking in full sentences  CV: RRR, no murmurs, rubs or gallops  Abd: soft, RLQ tenderness  MSK: no visible deformities  Neuro: No focal sensory or motor deficits  Skin: Warm, well perfused, no rash  Psych: normal affect.   ~Ron Araujo M.D. Resident

## 2019-09-17 NOTE — ED ADULT TRIAGE NOTE - CHIEF COMPLAINT QUOTE
Pt. c/o RLQ pain that began yesterday, worsening today. Endorses 1 episode of vomiting earlier today. Reports she had a  done on 19. Reports taking 650mg of tylenol at 8 am without relief. Denies fever, chills, SOB, drainage from surgical site. Appears uncomfortable.

## 2019-09-17 NOTE — ED PROVIDER NOTE - INPATIENT RESIDENT/ACP NOTIFIED DATE
Denies known Latex allergy or symptoms of Latex sensitivity.   Medications reviewed with patient:No changes made.  Tobacco use verified.  Medical and Surgical history reviewed: No changes made.      Preferred Pharmacy:   [facility] - [place] - [id number]  AT [facility] ([place], [place])  [id number] [place] [address]  Phone: [phone] Fax: [phone]        Refills needed? no  Letter for work/school needed? no    Chief Complaint   Patient presents with   • Knee Pain     Right knee Euflexxa injection #1       18-Sep-2019 08:04

## 2019-09-17 NOTE — ED PROVIDER NOTE - PROGRESS NOTE DETAILS
Ron Araujo M.D. Resident: Discussed patient with OB, awaiting TVUS and CT Ron Araujo M.D. Resident: Patient continues to have abdominal pain requiring multiple doses of IV narcotics, patient offered CDU admission, discussed with CDU, will evaluate pt during day team BHAKTI PALOMINO:  Pt accepted to CDU for pain control, antibiotics and reassessment.

## 2019-09-17 NOTE — ED PROVIDER NOTE - ATTENDING CONTRIBUTION TO CARE
Attending MD Painter.  Agree with above. Pt is a 34 yo female with no medical problems previously.  Pt had a  3 wks ago.  No fevers/chills, one episode vomiting today.  Vaginal bleeding since delivery.  Incision site clean, dry, RLQ TTP.  Pt well appearing.  No hx of appendectomy.  Remotely s/p cholecystectomy.  Pt had a tubal ligation during this .  Pt is quite tender with constant pain since yesterday. Attending MD Painter.  Agree with above. Pt is a 34 yo female with no medical problems previously.  Pt had a  3 wks ago.  No fevers/chills, one episode vomiting today.  Vaginal bleeding since delivery.  Incision site clean, dry, RLQ TTP.  Pt well appearing.  No hx of appendectomy.  Remotely s/p cholecystectomy.  Pt had a tubal ligation during this .  Pt is quite tender with constant pain since yesterday.  Pt is alert and oriented in NAD.  On exam poss firm induration noted to R of abdomen superficially. Pt is tender in this location.  No skin changes.  No bleeding/oozing from the wound.  No dehiscence.  Charge and nursing alerted to need for breast pump to support pt's lactation.  Stable for signout to incoming team pending u/s and CT if u/s non-actionable.

## 2019-09-17 NOTE — ED PROVIDER NOTE - CLINICAL SUMMARY MEDICAL DECISION MAKING FREE TEXT BOX
34 yo F PMHx C/S 8/30 p/w RLQ abd pain x 2 days with RLQ tenderness on exam, DDx: postop abscess, appendicitis, ovarian torsion, will obtain TVUS, CTAP, full labs, IVF, pain control, reevaluate

## 2019-09-18 VITALS
RESPIRATION RATE: 16 BRPM | OXYGEN SATURATION: 95 % | DIASTOLIC BLOOD PRESSURE: 58 MMHG | TEMPERATURE: 99 F | SYSTOLIC BLOOD PRESSURE: 122 MMHG | HEART RATE: 86 BPM

## 2019-09-18 DIAGNOSIS — R10.9 UNSPECIFIED ABDOMINAL PAIN: ICD-10-CM

## 2019-09-18 LAB
ALBUMIN SERPL ELPH-MCNC: 3.9 G/DL — SIGNIFICANT CHANGE UP (ref 3.3–5)
ALP SERPL-CCNC: 90 U/L — SIGNIFICANT CHANGE UP (ref 40–120)
ALT FLD-CCNC: 13 U/L — SIGNIFICANT CHANGE UP (ref 4–33)
ANION GAP SERPL CALC-SCNC: 12 MMO/L — SIGNIFICANT CHANGE UP (ref 7–14)
APPEARANCE UR: CLEAR — SIGNIFICANT CHANGE UP
APTT BLD: 32.1 SEC — SIGNIFICANT CHANGE UP (ref 27.5–36.3)
AST SERPL-CCNC: 15 U/L — SIGNIFICANT CHANGE UP (ref 4–32)
BACTERIA # UR AUTO: NEGATIVE — SIGNIFICANT CHANGE UP
BASE EXCESS BLDV CALC-SCNC: -0.4 MMOL/L — SIGNIFICANT CHANGE UP
BASOPHILS # BLD AUTO: 0.05 K/UL — SIGNIFICANT CHANGE UP (ref 0–0.2)
BASOPHILS NFR BLD AUTO: 0.7 % — SIGNIFICANT CHANGE UP (ref 0–2)
BILIRUB SERPL-MCNC: 0.2 MG/DL — SIGNIFICANT CHANGE UP (ref 0.2–1.2)
BILIRUB UR-MCNC: NEGATIVE — SIGNIFICANT CHANGE UP
BLOOD GAS VENOUS - CREATININE: 0.84 MG/DL — SIGNIFICANT CHANGE UP (ref 0.5–1.3)
BLOOD UR QL VISUAL: SIGNIFICANT CHANGE UP
BUN SERPL-MCNC: 7 MG/DL — SIGNIFICANT CHANGE UP (ref 7–23)
CALCIUM SERPL-MCNC: 8.7 MG/DL — SIGNIFICANT CHANGE UP (ref 8.4–10.5)
CHLORIDE BLDV-SCNC: 107 MMOL/L — SIGNIFICANT CHANGE UP (ref 96–108)
CHLORIDE SERPL-SCNC: 105 MMOL/L — SIGNIFICANT CHANGE UP (ref 98–107)
CO2 SERPL-SCNC: 23 MMOL/L — SIGNIFICANT CHANGE UP (ref 22–31)
COLOR SPEC: SIGNIFICANT CHANGE UP
CREAT SERPL-MCNC: 0.72 MG/DL — SIGNIFICANT CHANGE UP (ref 0.5–1.3)
EOSINOPHIL # BLD AUTO: 0.13 K/UL — SIGNIFICANT CHANGE UP (ref 0–0.5)
EOSINOPHIL NFR BLD AUTO: 1.7 % — SIGNIFICANT CHANGE UP (ref 0–6)
GAS PNL BLDV: 136 MMOL/L — SIGNIFICANT CHANGE UP (ref 136–146)
GLUCOSE BLDV-MCNC: 91 MG/DL — SIGNIFICANT CHANGE UP (ref 70–99)
GLUCOSE SERPL-MCNC: 90 MG/DL — SIGNIFICANT CHANGE UP (ref 70–99)
GLUCOSE UR-MCNC: NEGATIVE — SIGNIFICANT CHANGE UP
HCG SERPL-ACNC: < 5 MIU/ML — SIGNIFICANT CHANGE UP
HCO3 BLDV-SCNC: 23 MMOL/L — SIGNIFICANT CHANGE UP (ref 20–27)
HCT VFR BLD CALC: 39 % — SIGNIFICANT CHANGE UP (ref 34.5–45)
HCT VFR BLDV CALC: 40.6 % — SIGNIFICANT CHANGE UP (ref 34.5–45)
HGB BLD-MCNC: 12.1 G/DL — SIGNIFICANT CHANGE UP (ref 11.5–15.5)
HGB BLDV-MCNC: 13.2 G/DL — SIGNIFICANT CHANGE UP (ref 11.5–15.5)
HYALINE CASTS # UR AUTO: NEGATIVE — SIGNIFICANT CHANGE UP
IMM GRANULOCYTES NFR BLD AUTO: 0.3 % — SIGNIFICANT CHANGE UP (ref 0–1.5)
INR BLD: 1.07 — SIGNIFICANT CHANGE UP (ref 0.88–1.17)
KETONES UR-MCNC: NEGATIVE — SIGNIFICANT CHANGE UP
LACTATE BLDV-MCNC: 2.1 MMOL/L — HIGH (ref 0.5–2)
LEUKOCYTE ESTERASE UR-ACNC: SIGNIFICANT CHANGE UP
LIDOCAIN IGE QN: 35.4 U/L — SIGNIFICANT CHANGE UP (ref 7–60)
LYMPHOCYTES # BLD AUTO: 1.81 K/UL — SIGNIFICANT CHANGE UP (ref 1–3.3)
LYMPHOCYTES # BLD AUTO: 24.1 % — SIGNIFICANT CHANGE UP (ref 13–44)
MCHC RBC-ENTMCNC: 24.3 PG — LOW (ref 27–34)
MCHC RBC-ENTMCNC: 31 % — LOW (ref 32–36)
MCV RBC AUTO: 78.5 FL — LOW (ref 80–100)
MONOCYTES # BLD AUTO: 0.56 K/UL — SIGNIFICANT CHANGE UP (ref 0–0.9)
MONOCYTES NFR BLD AUTO: 7.5 % — SIGNIFICANT CHANGE UP (ref 2–14)
NEUTROPHILS # BLD AUTO: 4.94 K/UL — SIGNIFICANT CHANGE UP (ref 1.8–7.4)
NEUTROPHILS NFR BLD AUTO: 65.7 % — SIGNIFICANT CHANGE UP (ref 43–77)
NITRITE UR-MCNC: NEGATIVE — SIGNIFICANT CHANGE UP
NRBC # FLD: 0 K/UL — SIGNIFICANT CHANGE UP (ref 0–0)
PCO2 BLDV: 43 MMHG — SIGNIFICANT CHANGE UP (ref 41–51)
PH BLDV: 7.37 PH — SIGNIFICANT CHANGE UP (ref 7.32–7.43)
PH UR: 6 — SIGNIFICANT CHANGE UP (ref 5–8)
PLATELET # BLD AUTO: 394 K/UL — SIGNIFICANT CHANGE UP (ref 150–400)
PMV BLD: 9.5 FL — SIGNIFICANT CHANGE UP (ref 7–13)
PO2 BLDV: 49 MMHG — HIGH (ref 35–40)
POTASSIUM BLDV-SCNC: 4 MMOL/L — SIGNIFICANT CHANGE UP (ref 3.4–4.5)
POTASSIUM SERPL-MCNC: 4.1 MMOL/L — SIGNIFICANT CHANGE UP (ref 3.5–5.3)
POTASSIUM SERPL-SCNC: 4.1 MMOL/L — SIGNIFICANT CHANGE UP (ref 3.5–5.3)
PROT SERPL-MCNC: 7.5 G/DL — SIGNIFICANT CHANGE UP (ref 6–8.3)
PROT UR-MCNC: NEGATIVE — SIGNIFICANT CHANGE UP
PROTHROM AB SERPL-ACNC: 12.2 SEC — SIGNIFICANT CHANGE UP (ref 9.8–13.1)
RBC # BLD: 4.97 M/UL — SIGNIFICANT CHANGE UP (ref 3.8–5.2)
RBC # FLD: 15 % — HIGH (ref 10.3–14.5)
RBC CASTS # UR COMP ASSIST: HIGH (ref 0–?)
SAO2 % BLDV: 80.1 % — SIGNIFICANT CHANGE UP (ref 60–85)
SODIUM SERPL-SCNC: 140 MMOL/L — SIGNIFICANT CHANGE UP (ref 135–145)
SP GR SPEC: 1.01 — SIGNIFICANT CHANGE UP (ref 1–1.04)
SQUAMOUS # UR AUTO: SIGNIFICANT CHANGE UP
UROBILINOGEN FLD QL: NORMAL — SIGNIFICANT CHANGE UP
WBC # BLD: 7.51 K/UL — SIGNIFICANT CHANGE UP (ref 3.8–10.5)
WBC # FLD AUTO: 7.51 K/UL — SIGNIFICANT CHANGE UP (ref 3.8–10.5)
WBC UR QL: SIGNIFICANT CHANGE UP (ref 0–?)

## 2019-09-18 PROCEDURE — 99234 HOSP IP/OBS SM DT SF/LOW 45: CPT

## 2019-09-18 PROCEDURE — 76830 TRANSVAGINAL US NON-OB: CPT | Mod: 26

## 2019-09-18 PROCEDURE — 74177 CT ABD & PELVIS W/CONTRAST: CPT | Mod: 26

## 2019-09-18 RX ORDER — HYDROMORPHONE HYDROCHLORIDE 2 MG/ML
1 INJECTION INTRAMUSCULAR; INTRAVENOUS; SUBCUTANEOUS ONCE
Refills: 0 | Status: DISCONTINUED | OUTPATIENT
Start: 2019-09-18 | End: 2019-09-18

## 2019-09-18 RX ORDER — CIPROFLOXACIN LACTATE 400MG/40ML
400 VIAL (ML) INTRAVENOUS EVERY 12 HOURS
Refills: 0 | Status: DISCONTINUED | OUTPATIENT
Start: 2019-09-18 | End: 2019-09-21

## 2019-09-18 RX ORDER — CIPROFLOXACIN LACTATE 400MG/40ML
500 VIAL (ML) INTRAVENOUS ONCE
Refills: 0 | Status: DISCONTINUED | OUTPATIENT
Start: 2019-09-18 | End: 2019-09-18

## 2019-09-18 RX ORDER — MORPHINE SULFATE 50 MG/1
4 CAPSULE, EXTENDED RELEASE ORAL ONCE
Refills: 0 | Status: DISCONTINUED | OUTPATIENT
Start: 2019-09-18 | End: 2019-09-18

## 2019-09-18 RX ORDER — ONDANSETRON 8 MG/1
4 TABLET, FILM COATED ORAL ONCE
Refills: 0 | Status: COMPLETED | OUTPATIENT
Start: 2019-09-18 | End: 2019-09-18

## 2019-09-18 RX ORDER — KETOROLAC TROMETHAMINE 30 MG/ML
15 SYRINGE (ML) INJECTION ONCE
Refills: 0 | Status: DISCONTINUED | OUTPATIENT
Start: 2019-09-18 | End: 2019-09-18

## 2019-09-18 RX ORDER — CIPROFLOXACIN LACTATE 400MG/40ML
400 VIAL (ML) INTRAVENOUS ONCE
Refills: 0 | Status: COMPLETED | OUTPATIENT
Start: 2019-09-18 | End: 2019-09-18

## 2019-09-18 RX ADMIN — ONDANSETRON 4 MILLIGRAM(S): 8 TABLET, FILM COATED ORAL at 10:57

## 2019-09-18 RX ADMIN — MORPHINE SULFATE 4 MILLIGRAM(S): 50 CAPSULE, EXTENDED RELEASE ORAL at 04:06

## 2019-09-18 RX ADMIN — MORPHINE SULFATE 4 MILLIGRAM(S): 50 CAPSULE, EXTENDED RELEASE ORAL at 03:48

## 2019-09-18 RX ADMIN — HYDROMORPHONE HYDROCHLORIDE 1 MILLIGRAM(S): 2 INJECTION INTRAMUSCULAR; INTRAVENOUS; SUBCUTANEOUS at 04:07

## 2019-09-18 RX ADMIN — SODIUM CHLORIDE 1000 MILLILITER(S): 9 INJECTION INTRAMUSCULAR; INTRAVENOUS; SUBCUTANEOUS at 03:48

## 2019-09-18 RX ADMIN — MORPHINE SULFATE 4 MILLIGRAM(S): 50 CAPSULE, EXTENDED RELEASE ORAL at 10:58

## 2019-09-18 RX ADMIN — MORPHINE SULFATE 4 MILLIGRAM(S): 50 CAPSULE, EXTENDED RELEASE ORAL at 11:28

## 2019-09-18 RX ADMIN — Medication 15 MILLIGRAM(S): at 10:57

## 2019-09-18 RX ADMIN — HYDROMORPHONE HYDROCHLORIDE 1 MILLIGRAM(S): 2 INJECTION INTRAMUSCULAR; INTRAVENOUS; SUBCUTANEOUS at 06:31

## 2019-09-18 RX ADMIN — HYDROMORPHONE HYDROCHLORIDE 1 MILLIGRAM(S): 2 INJECTION INTRAMUSCULAR; INTRAVENOUS; SUBCUTANEOUS at 08:18

## 2019-09-18 RX ADMIN — Medication 200 MILLIGRAM(S): at 06:31

## 2019-09-18 RX ADMIN — HYDROMORPHONE HYDROCHLORIDE 1 MILLIGRAM(S): 2 INJECTION INTRAMUSCULAR; INTRAVENOUS; SUBCUTANEOUS at 04:50

## 2019-09-18 RX ADMIN — Medication 400 MILLIGRAM(S): at 08:18

## 2019-09-18 RX ADMIN — Medication 15 MILLIGRAM(S): at 11:27

## 2019-09-18 NOTE — ED CDU PROVIDER INITIAL DAY NOTE - OBJECTIVE STATEMENT
ED Provider Note: 34 yo F PMHx C/S 8/30 p/w RLQ abd pain x 2 days. Pain started yesterday and gradually worsened today. No fevers/chills, +nausea/vomiting (one episode today). She has been having vaginal spotting since the delivery. Abdominal incision healing well without s/sx of infection. She is breastfeeding at home. She has a hx of cholecystectomy and tubal ligation during this C/S.  CDU Initial Day Note: BHAKTI Meza, Agree w/ above, pt w/ enteritis on CT of abd/pel, requiring pain meds and anti-emetics, observed in CDU for serial abd exams and pain control. Pt vomited ~1.5 hours ago while still in green section, given zofran and morphine ordered by me, pt resting comfortably presently. Will continue to obs and document any interval changes.

## 2019-09-18 NOTE — CONSULT NOTE ADULT - ASSESSMENT
34 y/o  s/p classical  and bilateral partial salpingectomy on 19, case complicated by dense adhesions, presenting with RLQ pain. VSS. CBC and CMP wnl. Lactate noted to be 2.1 on blood gas. Exam as above, +RLQ tenderness, abdomen not acute.  - f/u TVUS read, CT A/P, UA  - Final recs pending additional work up    ALAYNA Raygoza PGY2  TBD w/ Dr. Bueno 32 y/o  s/p classical  and bilateral partial salpingectomy on 19, case complicated by dense adhesions, presenting with RLQ pain. VSS. CBC and CMP wnl. Lactate noted to be 2.1 on blood gas. Exam as above, +RLQ tenderness, abdomen not acute. Right ovary not visualized however low concern for torsion. No clear GYN etiology of RLQ at this time.    - CT A/P pending  - Final recs pending imaging    ALAYNA Raygoza PGY2  d/w Dr. Bueno 32 y/o  s/p classical  and bilateral partial salpingectomy on 19, case complicated by dense adhesions, presenting with RLQ pain. VSS. CBC and CMP wnl. Lactate noted to be 2.1 on blood gas. Exam as above, +RLQ tenderness, abdomen not acute. Right ovary not visualized however low concern for torsion. CT with enteritis, likely etiology of pain, consistent with clinical presentation and loose stools.

## 2019-09-18 NOTE — ED CDU PROVIDER INITIAL DAY NOTE - PHYSICAL EXAMINATION
Gen: AAOx3, non-toxic  Head: NCAT  HEENT: EOMI, oral mucosa moist, normal conjunctiva  Lung: CTAB, no respiratory distress, no wheezes/rhonchi/rales B/L, speaking in full sentences  CV: RRR, no murmurs, rubs or gallops  Abd: soft, RLQ tenderness  MSK: no visible deformities  Neuro: No focal sensory or motor deficits  Skin: Warm, well perfused, no rash  Psych: normal affect.

## 2019-09-18 NOTE — ED CDU PROVIDER DISPOSITION NOTE - PATIENT PORTAL LINK FT
You can access the FollowMyHealth Patient Portal offered by [facility] by registering at the following website: [email or web address]. By joining [facility]’s FollowMyHealth portal, you will also be able to view your health information using other applications (apps) compatible with our system.

## 2019-09-18 NOTE — ED CDU PROVIDER DISPOSITION NOTE - NSFOLLOWUPCLINICS_GEN_ALL_ED_FT
[facility] Gastroenterology  Gastroenterology  [address] [place] [id number]  Phone: [phone]  Fax:   Follow Up Time:

## 2019-09-18 NOTE — CONSULT NOTE ADULT - PROBLEM SELECTOR RECOMMENDATION 9
- Management of enteritis per ED  - Patient stable for discharge from GYN perspective    ALAYNA Raygoza PGY2  d/w Dr. Bueno

## 2019-09-18 NOTE — CONSULT NOTE ADULT - SUBJECTIVE AND OBJECTIVE BOX
R2 Gyn Consult Note    BRISEYDA LOWRY  33y  Female [id number]    HPI: 34 y/o  s/p classical  and bilateral partial salpingectomy on 19, case complicated by dense adhesions. Patient's post-op course was unremarkable. Patient presented to ED today with RLQ pain. She reports pain started yesterday and worsened steadily. Pain is constant, non-radiating. Tylenol does not make it better. Nothing makes it worse. Reports she had 3 loose stools earlier today. Also endorses continuation of lochia since delivery. A few days ago patient saw small clots in the toilet, she is not sure if these came from her vagina or her urethra. She denies dysuria, urgency, frequency. Reports hematuria. She denies fevers, chills, nausea, vomiting, chest pain, shortness of breath, palpitations, uterine cramping, heavy vaginal bleeding, purulent vaginal discharge, constipation.Patient last ate at 5pm, last had water at 8pm.     Name of GYN Physician: Dr. Crane    OBHx:  pLTCS @32w for NRFHT in the setting of PPROM #4.7, 2019 rCCS/BS complicated by dense adhesions  GYNHx: +fibroids, +abnormal pap; LEEP 14 years ago. Denies cysts, endometriosis, STI's  PMH: chronic anemia (possible thalassemia)  PSH: s/p LSC myomectomy , s/p LSC cholecystectomy , s/p pLTCS , s/p rCCS 2019, s/p D&C x2  All: Augmentin (thrush)  Meds: PNVs  Soc: no substance use, anxiety/depression    Vital Signs Last 24 Hrs  T(C): 36.7 (18 Sep 2019 00:06), Max: 36.7 (17 Sep 2019 21:22)  T(F): 98.1 (18 Sep 2019 00:06), Max: 98.1 (17 Sep 2019 21:22)  HR: 83 (18 Sep 2019 00:06) (83 - 93)  BP: 126/72 (18 Sep 2019 00:06) (122/84 - 126/72)  BP(mean): --  RR: 16 (18 Sep 2019 00:06) (16 - 18)  SpO2: 100% (18 Sep 2019 00:06) (100% - 100%)    Physical Exam:   General: sitting comfortably in bed, NAD   CV: RRR  Lungs: CTAB  Abd: Soft,  non-distended, +RLQ tenderness, +suprapubic tenderness, no rebound/no guarding  : Scant bleeding on pad   External labia wnl.  Bimanual exam with no cervical motion tenderness, uterus with fibroids, adnexa non palpable b/l.  Cervix closed  Speculum Exam: No active bleeding from os. Scant old blood mixed with physiologic discharge.    Ext: non-tender b/l, no edema     LABS:                              12.1   7.51  )-----------( 394      ( 17 Sep 2019 23:44 )             39.0         140  |  105  |  7   ----------------------------<  90  4.1   |  23  |  0.72    Ca    8.7      17 Sep 2019 23:44    TPro  7.5  /  Alb  3.9  /  TBili  0.2  /  DBili  x   /  AST  15  /  ALT  13  /  AlkPhos  90  -    I&O's Detail    PT/INR - ( 17 Sep 2019 23:44 )   PT: 12.2 SEC;   INR: 1.07          PTT - ( 17 Sep 2019 23:44 )  PTT:32.1 SEC R2 Gyn Consult Note    BRISEYDA LOWRY  33y  Female [id number]    HPI: 34 y/o  s/p classical  and bilateral partial salpingectomy on 19, case complicated by dense adhesions. Patient's post-op course was unremarkable. Patient presented to ED today with RLQ pain. She reports pain started yesterday and worsened steadily. Pain is constant, non-radiating. Tylenol does not make it better. Nothing makes it worse. Reports she had 3 loose stools earlier today. Also endorses continuation of lochia since delivery. A few days ago patient saw small clots in the toilet, she is not sure if these came from her vagina or her urethra. She denies dysuria, urgency, frequency. Reports hematuria. She denies fevers, chills, nausea, vomiting, chest pain, shortness of breath, palpitations, uterine cramping, heavy vaginal bleeding, purulent vaginal discharge, constipation.Patient last ate at 5pm, last had water at 8pm.     Name of GYN Physician: Dr. Crane    OBHx:  pLTCS @32w for NRFHT in the setting of PPROM #4.7, 2019 rCCS/BS complicated by dense adhesions  GYNHx: +fibroids, +abnormal pap; LEEP 14 years ago. Denies cysts, endometriosis, STI's  PMH: chronic anemia (possible thalassemia)  PSH: s/p LSC myomectomy , s/p LSC cholecystectomy , s/p pLTCS , s/p rCCS 2019, s/p D&C x2  All: Augmentin (thrush)  Meds: PNVs  Soc: no substance use, anxiety/depression    Vital Signs Last 24 Hrs  T(C): 36.7 (18 Sep 2019 00:06), Max: 36.7 (17 Sep 2019 21:22)  T(F): 98.1 (18 Sep 2019 00:06), Max: 98.1 (17 Sep 2019 21:22)  HR: 83 (18 Sep 2019 00:06) (83 - 93)  BP: 126/72 (18 Sep 2019 00:06) (122/84 - 126/72)  BP(mean): --  RR: 16 (18 Sep 2019 00:06) (16 - 18)  SpO2: 100% (18 Sep 2019 00:06) (100% - 100%)    Physical Exam:   General: sitting comfortably in bed, NAD   CV: RRR  Lungs: CTAB  Abd: Soft,  non-distended, +RLQ tenderness, +suprapubic tenderness, no rebound/no guarding  : Scant bleeding on pad   External labia wnl.  Bimanual exam with no cervical motion tenderness, uterus with fibroids, adnexa non palpable b/l.  Cervix closed  Speculum Exam: No active bleeding from os. Scant old blood mixed with physiologic discharge.    Ext: non-tender b/l, no edema     LABS:                              12.1   7.51  )-----------( 394      ( 17 Sep 2019 23:44 )             39.0         140  |  105  |  7   ----------------------------<  90  4.1   |  23  |  0.72    Ca    8.7      17 Sep 2019 23:44    TPro  7.5  /  Alb  3.9  /  TBili  0.2  /  DBili  x   /  AST  15  /  ALT  13  /  AlkPhos  90      I&O's Detail    PT/INR - ( 17 Sep 2019 23:44 )   PT: 12.2 SEC;   INR: 1.07          PTT - ( 17 Sep 2019 23:44 )  PTT:32.1 SEC    Urinalysis Basic - ( 18 Sep 2019 04:10 )    Color: LIGHT YELLOW / Appearance: CLEAR / S.013 / pH: 6.0  Gluc: NEGATIVE / Ketone: NEGATIVE  / Bili: NEGATIVE / Urobili: NORMAL   Blood: SMALL / Protein: NEGATIVE / Nitrite: NEGATIVE   Leuk Esterase: SMALL / RBC: 11-25 / WBC 3-5   Sq Epi: OCC / Non Sq Epi: x / Bacteria: NEGATIVE      < from: US Transvaginal (19 @ 01:06) >    EXAM:  US TRANSVAGINAL        PROCEDURE DATE:  Sep 18 2019         INTERPRETATION:  CLINICAL INFORMATION: Right lower quadrant tenderness.   Patient is 3 weeks postpartum. Evaluate for torsion.    LMP: Unknown to patient.    COMPARISON: None available.    TECHNIQUE: Endovaginal pelvic sonogram only. Color and Spectral Doppler   was performed.     FINDINGS:    Limited study due to overlying bowel gas.    Uterus: Enlarged measuring 11.5 x 7.3 x 8.3 cm with multiple uterine   fibroids largest a calcified fibroid measuring 2.2 x 1.9 x 1.9 cm.    Endometrium: 6 mm. Within normal limits.    Right ovary: 3.8 x 2.2 x 3.4 cm. Within normal limits. Normal arterial   and venous waveforms.    Left ovary: Not visualized due to overlying bowel gas.    Fluid: Trace simple free fluid.    IMPRESSION:    1. Myomatous uterus.  2. No evidence of right ovarian torsion. Nonvisualized left ovary.    < end of copied text > R2 Gyn Consult Note    BRISEYDA LOWRY  33y  Female [id number]    HPI: 32 y/o  s/p classical  and bilateral partial salpingectomy on 19, case complicated by dense adhesions. Patient's post-op course was unremarkable. Patient presented to ED today with RLQ pain. She reports pain started yesterday and worsened steadily. Pain is constant, non-radiating. Tylenol does not make it better. Nothing makes it worse. Reports she had 3 loose stools earlier today. Also endorses continuation of lochia since delivery. A few days ago patient saw small clots in the toilet, she is not sure if these came from her vagina or her urethra. She denies dysuria, urgency, frequency. Reports hematuria. She denies fevers, chills, nausea, vomiting, chest pain, shortness of breath, palpitations, uterine cramping, heavy vaginal bleeding, purulent vaginal discharge, constipation.Patient last ate at 5pm, last had water at 8pm.     Name of GYN Physician: Dr. Crane    OBHx:  pLTCS @32w for NRFHT in the setting of PPROM #4.7, 2019 rCCS/BS complicated by dense adhesions  GYNHx: +fibroids, +abnormal pap; LEEP 14 years ago. Denies cysts, endometriosis, STI's  PMH: chronic anemia (possible thalassemia)  PSH: s/p LSC myomectomy , s/p LSC cholecystectomy , s/p pLTCS , s/p rCCS 2019, s/p D&C x2  All: Augmentin (thrush)  Meds: PNVs  Soc: no substance use, anxiety/depression    Vital Signs Last 24 Hrs  T(C): 36.7 (18 Sep 2019 00:06), Max: 36.7 (17 Sep 2019 21:22)  T(F): 98.1 (18 Sep 2019 00:06), Max: 98.1 (17 Sep 2019 21:22)  HR: 83 (18 Sep 2019 00:06) (83 - 93)  BP: 126/72 (18 Sep 2019 00:06) (122/84 - 126/72)  BP(mean): --  RR: 16 (18 Sep 2019 00:06) (16 - 18)  SpO2: 100% (18 Sep 2019 00:06) (100% - 100%)    Physical Exam:   General: sitting comfortably in bed, NAD   CV: RRR  Lungs: CTAB  Abd: Soft,  non-distended, +RLQ tenderness, +suprapubic tenderness, no rebound/no guarding  : Scant bleeding on pad   External labia wnl.  Bimanual exam with no cervical motion tenderness, uterus with fibroids, adnexa non palpable b/l.  Cervix closed  Speculum Exam: No active bleeding from os. Scant old blood mixed with physiologic discharge.    Ext: non-tender b/l, no edema     LABS:                              12.1   7.51  )-----------( 394      ( 17 Sep 2019 23:44 )             39.0         140  |  105  |  7   ----------------------------<  90  4.1   |  23  |  0.72    Ca    8.7      17 Sep 2019 23:44    TPro  7.5  /  Alb  3.9  /  TBili  0.2  /  DBili  x   /  AST  15  /  ALT  13  /  AlkPhos  90      I&O's Detail    PT/INR - ( 17 Sep 2019 23:44 )   PT: 12.2 SEC;   INR: 1.07          PTT - ( 17 Sep 2019 23:44 )  PTT:32.1 SEC    Urinalysis Basic - ( 18 Sep 2019 04:10 )    Color: LIGHT YELLOW / Appearance: CLEAR / S.013 / pH: 6.0  Gluc: NEGATIVE / Ketone: NEGATIVE  / Bili: NEGATIVE / Urobili: NORMAL   Blood: SMALL / Protein: NEGATIVE / Nitrite: NEGATIVE   Leuk Esterase: SMALL / RBC: 11-25 / WBC 3-5   Sq Epi: OCC / Non Sq Epi: x / Bacteria: NEGATIVE      < from: US Transvaginal (19 @ 01:06) >    EXAM:  US TRANSVAGINAL        PROCEDURE DATE:  Sep 18 2019         INTERPRETATION:  CLINICAL INFORMATION: Right lower quadrant tenderness.   Patient is 3 weeks postpartum. Evaluate for torsion.    LMP: Unknown to patient.    COMPARISON: None available.    TECHNIQUE: Endovaginal pelvic sonogram only. Color and Spectral Doppler   was performed.     FINDINGS:    Limited study due to overlying bowel gas.    Uterus: Enlarged measuring 11.5 x 7.3 x 8.3 cm with multiple uterine   fibroids largest a calcified fibroid measuring 2.2 x 1.9 x 1.9 cm.    Endometrium: 6 mm. Within normal limits.    Right ovary: 3.8 x 2.2 x 3.4 cm. Within normal limits. Normal arterial   and venous waveforms.    Left ovary: Not visualized due to overlying bowel gas.    Fluid: Trace simple free fluid.    IMPRESSION:    1. Myomatous uterus.  2. No evidence of right ovarian torsion. Nonvisualized left ovary.    < end of copied text >    < from: CT Abdomen and Pelvis w/ IV Cont (19 @ 05:33) >  EXAM:  CT ABDOMEN AND PELVIS IC        PROCEDURE DATE:  Sep 18 2019         INTERPRETATION:  CLINICAL INFORMATION: Right lower quadrant abdominal   pain. Status post  section a few weeks ago.    COMPARISON: None.    PROCEDURE:   CT of the Abdomen and Pelvis was performed with intravenous contrast.   Intravenous contrast: 90 ml Omnipaque 350. 10 ml discarded.  Oral contrast: None.  Sagittal and coronal reformats were performed.    FINDINGS:    LOWER CHEST: Trace bilateral pleural effusions with adjacent compressive   atelectasis.    LIVER: Pneumobilia.  BILE DUCTS: Mild intrahepatic biliary ductal dilatation and common bile   duct dilatation likely a sequela of prior cholecystectomy.  GALLBLADDER: Cholecystectomy.  SPLEEN: Within normal limits.  PANCREAS: Within normal limits.  ADRENALS: Within normal limits.  KIDNEYS/URETERS: Within normal limits.    BLADDER: Within normal limits.  REPRODUCTIVE ORGANS: Mildly enlarged post gravid uterus containing a   calcified fibroid. Ovaries within normal limits.    BOWEL: Moderate length segment loop of small bowel in the anterior pelvis   with associated bowel wall thickening, wall hyperemia and adjacent   inflammatory change compatible with an enteritis. No bowel obstruction.   Small hiatal hernia. Appendix is unremarkable.  PERITONEUM: Small amount of abdominal and pelvic free fluid.  VESSELS: Within normal limits.  RETROPERITONEUM/LYMPH NODES: No lymphadenopathy.    ABDOMINAL WALL: Subcutaneous inflammatory change in the anteriorpelvic   wall likely postsurgical in nature.  BONES: Within normal limits.    IMPRESSION:     1. No evidence of appendicitis.  2. Enteritis involving a loop of small bowel in the anterior pelvis   likely infectious or inflammatory in etiology.    < end of copied text >

## 2019-09-18 NOTE — ED CDU PROVIDER INITIAL DAY NOTE - ATTENDING CONTRIBUTION TO CARE
I performed a face to face bedside interview with patient regarding history of present illness, review of symptoms and past medical history. I completed an independent physical exam.  I have discussed patient's plan of care.   I agree with note as stated above, having amended the EMR as needed to reflect my findings. I have discussed the assessment and plan of care.  This includes during the time I functioned as the attending physician for this patient.  Attending Contribution to Care: agree with plan of resident. 32 yo F PMHx C/S 8/30 p/w RLQ abd pain x 2 days. Pain started yesterday and gradually worsened today. No fevers/chills, +nausea/vomiting (one episode today). She has been having vaginal spotting since the delivery. Abdominal incision healing well without s/sx of infection. She is breastfeeding at home. She has a hx of cholecystectomy and tubal ligation during this C/S.

## 2019-09-18 NOTE — ED ADULT NURSE NOTE - OBJECTIVE STATEMENT
Braeden RN: Patient received in room #14 c/o RLQ pain starting yesterday. Patient A&OX3, ambulatory. Patient c/o N/V, denies Diarrhea. Patient reports post csection 8/30/19, suture site appears dry and intact. VS as noted. 20G IV Placed in right hand, labs drawn and sent. Report given to covering RN Alycia. Will monitor.

## 2019-09-18 NOTE — ED ADULT NURSE REASSESSMENT NOTE - NS ED NURSE REASSESS COMMENT FT1
Pt aaox4. Vital signs reassessed as noted. Pt states no relief after medication administration; MD made aware, pt medicated as per MD order. Pt repositioned for comfort. Call light within reach. Will continue to monitor.
Pt aaox4. Vital signs reassessed as noted. Pt states pain worsens with movement; states still minor relief of pain; MD made aware. Pt medicated as per MD order. Call light within reach. Will continue to monitor.
Pt resting comfortably in bed, c/o abd pain at this time, BHAKTI Ayers made aware, awaiting orders, pt stable, in NAD, will continue to monitor.

## 2019-09-18 NOTE — ED CDU PROVIDER DISPOSITION NOTE - NSFOLLOWUPINSTRUCTIONS_ED_ALL_ED_FT
Advance activity as tolerated.  Continue all previously prescribed medications as directed unless otherwise instructed.  Follow up with your primary care physician in 48-72 hours- bring copies of your results.  Return to the ER for worsening or persistent symptoms, and/or ANY NEW OR CONCERNING SYMPTOMS. If you have issues obtaining follow up, please call: [phone] to obtain a doctor or specialist who takes your insurance in your area.  You may call [phone] to make an appointment with the internal medicine clinic.

## 2019-09-18 NOTE — ED CDU PROVIDER DISPOSITION NOTE - CLINICAL COURSE
32 yo F PMHx C/S 8/30 p/w RLQ abd pain x 2 days. Pain started yesterday and gradually worsened today. No fevers/chills, +nausea/vomiting (one episode today). She has been having vaginal spotting since the delivery. Abdominal incision healing well without s/sx of infection. She is breastfeeding at home. She has a hx of cholecystectomy and tubal ligation during this C/S.  eval by gi with no specific recommendations, except colonoscopy. no abx recommended. possible chrons. will eval out pt with gi appt tomorrow. tolerating po. no other sig lab abnormalities.

## 2019-09-19 LAB
BACTERIA UR CULT: SIGNIFICANT CHANGE UP
SPECIMEN SOURCE: SIGNIFICANT CHANGE UP

## 2019-09-24 LAB
RUBV IGG SER-ACNC: SIGNIFICANT CHANGE UP
RUBV IGG SER-IMP: POSITIVE — SIGNIFICANT CHANGE UP
T PALLIDUM AB TITR SER: NEGATIVE — SIGNIFICANT CHANGE UP

## 2019-10-08 DIAGNOSIS — O28.1 ABNORMAL BIOCHEMICAL FINDING ON ANTENATAL SCREENING OF MOTHER: ICD-10-CM

## 2019-10-08 DIAGNOSIS — O34.13 MATERNAL CARE FOR BENIGN TUMOR OF CORPUS UTERI, THIRD TRIMESTER: ICD-10-CM

## 2019-10-08 DIAGNOSIS — O09.293 SUPERVISION OF PREGNANCY WITH OTHER POOR REPRODUCTIVE OR OBSTETRIC HISTORY, THIRD TRIMESTER: ICD-10-CM

## 2020-05-18 NOTE — ED ADULT NURSE NOTE - NS ED PATIENT SAFETY CONCERN
No Ftsg Text: The defect edges were debeveled with a #15 scalpel blade.  Given the location of the defect, shape of the defect and the proximity to free margins a full thickness skin graft was deemed most appropriate.  Using a sterile surgical marker, the primary defect shape was transferred to the donor site. The area thus outlined was incised deep to adipose tissue with a #15 scalpel blade.  The harvested graft was then trimmed of adipose tissue until only dermis and epidermis was left.  The skin margins of the secondary defect were undermined to an appropriate distance in all directions utilizing iris scissors.  The secondary defect was closed with interrupted buried subcutaneous sutures.  The skin edges were then re-apposed with running  sutures.  The skin graft was then placed in the primary defect and oriented appropriately.

## 2020-06-18 ENCOUNTER — APPOINTMENT (OUTPATIENT)
Dept: ULTRASOUND IMAGING | Facility: CLINIC | Age: 35
End: 2020-06-18
Payer: COMMERCIAL

## 2020-06-18 ENCOUNTER — OUTPATIENT (OUTPATIENT)
Dept: OUTPATIENT SERVICES | Facility: HOSPITAL | Age: 35
LOS: 1 days | End: 2020-06-18
Payer: COMMERCIAL

## 2020-06-18 DIAGNOSIS — Z98.890 OTHER SPECIFIED POSTPROCEDURAL STATES: Chronic | ICD-10-CM

## 2020-06-18 DIAGNOSIS — N64.59 OTHER SIGNS AND SYMPTOMS IN BREAST: ICD-10-CM

## 2020-06-18 DIAGNOSIS — Z33.2 ENCOUNTER FOR ELECTIVE TERMINATION OF PREGNANCY: Chronic | ICD-10-CM

## 2020-06-18 DIAGNOSIS — G56.00 CARPAL TUNNEL SYNDROME, UNSPECIFIED UPPER LIMB: Chronic | ICD-10-CM

## 2020-06-18 DIAGNOSIS — Z98.891 HISTORY OF UTERINE SCAR FROM PREVIOUS SURGERY: Chronic | ICD-10-CM

## 2020-06-18 DIAGNOSIS — Z90.49 ACQUIRED ABSENCE OF OTHER SPECIFIED PARTS OF DIGESTIVE TRACT: Chronic | ICD-10-CM

## 2020-06-18 PROCEDURE — 76641 ULTRASOUND BREAST COMPLETE: CPT

## 2020-06-18 PROCEDURE — 76641 ULTRASOUND BREAST COMPLETE: CPT | Mod: 26,50

## 2020-08-05 ENCOUNTER — OUTPATIENT (OUTPATIENT)
Dept: OUTPATIENT SERVICES | Facility: HOSPITAL | Age: 35
LOS: 1 days | End: 2020-08-05
Payer: COMMERCIAL

## 2020-08-05 VITALS
HEIGHT: 63 IN | RESPIRATION RATE: 16 BRPM | SYSTOLIC BLOOD PRESSURE: 124 MMHG | DIASTOLIC BLOOD PRESSURE: 83 MMHG | TEMPERATURE: 99 F | HEART RATE: 90 BPM | OXYGEN SATURATION: 98 % | WEIGHT: 222.01 LBS

## 2020-08-05 DIAGNOSIS — Z98.891 HISTORY OF UTERINE SCAR FROM PREVIOUS SURGERY: Chronic | ICD-10-CM

## 2020-08-05 DIAGNOSIS — Z01.818 ENCOUNTER FOR OTHER PREPROCEDURAL EXAMINATION: ICD-10-CM

## 2020-08-05 DIAGNOSIS — N62 HYPERTROPHY OF BREAST: ICD-10-CM

## 2020-08-05 DIAGNOSIS — Z98.890 OTHER SPECIFIED POSTPROCEDURAL STATES: Chronic | ICD-10-CM

## 2020-08-05 DIAGNOSIS — G56.00 CARPAL TUNNEL SYNDROME, UNSPECIFIED UPPER LIMB: Chronic | ICD-10-CM

## 2020-08-05 DIAGNOSIS — Z98.51 TUBAL LIGATION STATUS: Chronic | ICD-10-CM

## 2020-08-05 DIAGNOSIS — Z90.49 ACQUIRED ABSENCE OF OTHER SPECIFIED PARTS OF DIGESTIVE TRACT: Chronic | ICD-10-CM

## 2020-08-05 DIAGNOSIS — Z33.2 ENCOUNTER FOR ELECTIVE TERMINATION OF PREGNANCY: Chronic | ICD-10-CM

## 2020-08-05 LAB
ANION GAP SERPL CALC-SCNC: 5 MMOL/L — SIGNIFICANT CHANGE UP (ref 5–17)
BUN SERPL-MCNC: 10 MG/DL — SIGNIFICANT CHANGE UP (ref 7–23)
CALCIUM SERPL-MCNC: 8.8 MG/DL — SIGNIFICANT CHANGE UP (ref 8.5–10.1)
CHLORIDE SERPL-SCNC: 108 MMOL/L — SIGNIFICANT CHANGE UP (ref 96–108)
CO2 SERPL-SCNC: 27 MMOL/L — SIGNIFICANT CHANGE UP (ref 22–31)
CREAT SERPL-MCNC: 0.8 MG/DL — SIGNIFICANT CHANGE UP (ref 0.5–1.3)
GLUCOSE SERPL-MCNC: 90 MG/DL — SIGNIFICANT CHANGE UP (ref 70–99)
HCG SERPL-ACNC: <1 MIU/ML — SIGNIFICANT CHANGE UP
HCT VFR BLD CALC: 39.3 % — SIGNIFICANT CHANGE UP (ref 34.5–45)
HGB BLD-MCNC: 12.6 G/DL — SIGNIFICANT CHANGE UP (ref 11.5–15.5)
MCHC RBC-ENTMCNC: 24.7 PG — LOW (ref 27–34)
MCHC RBC-ENTMCNC: 32.1 GM/DL — SIGNIFICANT CHANGE UP (ref 32–36)
MCV RBC AUTO: 76.9 FL — LOW (ref 80–100)
NRBC # BLD: 0 /100 WBCS — SIGNIFICANT CHANGE UP (ref 0–0)
PLATELET # BLD AUTO: 345 K/UL — SIGNIFICANT CHANGE UP (ref 150–400)
POTASSIUM SERPL-MCNC: 3.5 MMOL/L — SIGNIFICANT CHANGE UP (ref 3.5–5.3)
POTASSIUM SERPL-SCNC: 3.5 MMOL/L — SIGNIFICANT CHANGE UP (ref 3.5–5.3)
RBC # BLD: 5.11 M/UL — SIGNIFICANT CHANGE UP (ref 3.8–5.2)
RBC # FLD: 14.3 % — SIGNIFICANT CHANGE UP (ref 10.3–14.5)
SODIUM SERPL-SCNC: 140 MMOL/L — SIGNIFICANT CHANGE UP (ref 135–145)
WBC # BLD: 7.15 K/UL — SIGNIFICANT CHANGE UP (ref 3.8–10.5)
WBC # FLD AUTO: 7.15 K/UL — SIGNIFICANT CHANGE UP (ref 3.8–10.5)

## 2020-08-05 PROCEDURE — 80048 BASIC METABOLIC PNL TOTAL CA: CPT

## 2020-08-05 PROCEDURE — 85027 COMPLETE CBC AUTOMATED: CPT

## 2020-08-05 PROCEDURE — 36415 COLL VENOUS BLD VENIPUNCTURE: CPT

## 2020-08-05 PROCEDURE — G0463: CPT

## 2020-08-05 PROCEDURE — 84702 CHORIONIC GONADOTROPIN TEST: CPT

## 2020-08-05 NOTE — H&P PST ADULT - NSICDXPASTSURGICALHX_GEN_ALL_CORE_FT
PAST SURGICAL HISTORY:  H/O  section x 2 (  and )    History of cholecystectomy     History of myomectomy     S/P D&C (status post dilation and curettage) 2/2 missed AB in     S/P tubal ligation  with last     Termination of pregnancy (fetus)

## 2020-08-05 NOTE — H&P PST ADULT - NSICDXPROBLEM_GEN_ALL_CORE_FT
PROBLEM DIAGNOSES  Problem: Hypertrophy of breast  Assessment and Plan: scheduled for a bilateral breast reduction on 8/11 with Dr. Lindsey    Problem: Pre-op evaluation  Assessment and Plan: Labs - CBC, BMP and HCG. To be swabbed for COVID @ [facility]. Appt TBD    No MC needed or requested.   Pre op and Hibiclens instructions reviewed and given. Instructed to avoid NSAIDs and OTC supplements. Verbalized understanding

## 2020-08-05 NOTE — H&P PST ADULT - GENERAL COMMENTS
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

## 2020-08-05 NOTE — H&P PST ADULT - HISTORY OF PRESENT ILLNESS
35 yo female presents to PST scheduled for a bilateral breast reduction on 8/11 with Dr. Lindsey 33 yo obese female with no significant pmh, presents to PST scheduled for a bilateral breast reduction on 8/11 with Dr. Lindsey. Reports CBP, shoulder and neck with skin irritation under the breasts secondary to hypertrophy of breast.

## 2020-08-08 ENCOUNTER — OUTPATIENT (OUTPATIENT)
Dept: OUTPATIENT SERVICES | Facility: HOSPITAL | Age: 35
LOS: 1 days | End: 2020-08-08
Payer: COMMERCIAL

## 2020-08-08 DIAGNOSIS — Z11.59 ENCOUNTER FOR SCREENING FOR OTHER VIRAL DISEASES: ICD-10-CM

## 2020-08-08 DIAGNOSIS — Z90.49 ACQUIRED ABSENCE OF OTHER SPECIFIED PARTS OF DIGESTIVE TRACT: Chronic | ICD-10-CM

## 2020-08-08 DIAGNOSIS — Z33.2 ENCOUNTER FOR ELECTIVE TERMINATION OF PREGNANCY: Chronic | ICD-10-CM

## 2020-08-08 DIAGNOSIS — Z98.890 OTHER SPECIFIED POSTPROCEDURAL STATES: Chronic | ICD-10-CM

## 2020-08-08 DIAGNOSIS — Z98.51 TUBAL LIGATION STATUS: Chronic | ICD-10-CM

## 2020-08-08 DIAGNOSIS — Z98.891 HISTORY OF UTERINE SCAR FROM PREVIOUS SURGERY: Chronic | ICD-10-CM

## 2020-08-08 LAB — SARS-COV-2 RNA SPEC QL NAA+PROBE: SIGNIFICANT CHANGE UP

## 2020-08-08 PROCEDURE — U0003: CPT

## 2020-08-10 ENCOUNTER — TRANSCRIPTION ENCOUNTER (OUTPATIENT)
Age: 35
End: 2020-08-10

## 2020-08-10 NOTE — ASU PATIENT PROFILE, ADULT - PSH
H/O  section  x 2 (  and )  History of cholecystectomy    History of myomectomy    S/P D&C (status post dilation and curettage)  2/2 missed AB in   S/P tubal ligation   with last   Termination of pregnancy (fetus)

## 2020-08-10 NOTE — ASU PATIENT PROFILE, ADULT - SPIRITUAL CULTURAL, CURRENT SITUATION, PROFILE
none Helical Rim Advancement Flap Text: The defect edges were debeveled with a #15 blade scalpel.  Given the location of the defect and the proximity to free margins (helical rim) a double helical rim advancement flap was deemed most appropriate.  Using a sterile surgical marker, the appropriate advancement flaps were drawn incorporating the defect and placing the expected incisions between the helical rim and antihelix where possible.  The area thus outlined was incised through and through with a #15 scalpel blade.  With a skin hook and iris scissors, the flaps were gently and sharply undermined and freed up.

## 2020-08-11 ENCOUNTER — OUTPATIENT (OUTPATIENT)
Dept: OUTPATIENT SERVICES | Facility: HOSPITAL | Age: 35
LOS: 1 days | End: 2020-08-11
Payer: COMMERCIAL

## 2020-08-11 ENCOUNTER — RESULT REVIEW (OUTPATIENT)
Age: 35
End: 2020-08-11

## 2020-08-11 VITALS
DIASTOLIC BLOOD PRESSURE: 80 MMHG | SYSTOLIC BLOOD PRESSURE: 117 MMHG | RESPIRATION RATE: 16 BRPM | HEIGHT: 63 IN | HEART RATE: 85 BPM | TEMPERATURE: 98 F | OXYGEN SATURATION: 99 % | WEIGHT: 222.01 LBS

## 2020-08-11 VITALS
DIASTOLIC BLOOD PRESSURE: 74 MMHG | OXYGEN SATURATION: 97 % | RESPIRATION RATE: 15 BRPM | SYSTOLIC BLOOD PRESSURE: 124 MMHG | HEART RATE: 76 BPM

## 2020-08-11 DIAGNOSIS — Z98.890 OTHER SPECIFIED POSTPROCEDURAL STATES: Chronic | ICD-10-CM

## 2020-08-11 DIAGNOSIS — N62 HYPERTROPHY OF BREAST: ICD-10-CM

## 2020-08-11 DIAGNOSIS — Z01.818 ENCOUNTER FOR OTHER PREPROCEDURAL EXAMINATION: ICD-10-CM

## 2020-08-11 DIAGNOSIS — Z90.49 ACQUIRED ABSENCE OF OTHER SPECIFIED PARTS OF DIGESTIVE TRACT: Chronic | ICD-10-CM

## 2020-08-11 DIAGNOSIS — Z98.51 TUBAL LIGATION STATUS: Chronic | ICD-10-CM

## 2020-08-11 DIAGNOSIS — Z98.891 HISTORY OF UTERINE SCAR FROM PREVIOUS SURGERY: Chronic | ICD-10-CM

## 2020-08-11 DIAGNOSIS — Z33.2 ENCOUNTER FOR ELECTIVE TERMINATION OF PREGNANCY: Chronic | ICD-10-CM

## 2020-08-11 PROCEDURE — 88305 TISSUE EXAM BY PATHOLOGIST: CPT | Mod: 26

## 2020-08-11 PROCEDURE — 19318 BREAST REDUCTION: CPT | Mod: 50

## 2020-08-11 PROCEDURE — 88305 TISSUE EXAM BY PATHOLOGIST: CPT

## 2020-08-11 RX ORDER — ONDANSETRON 8 MG/1
4 TABLET, FILM COATED ORAL ONCE
Refills: 0 | Status: DISCONTINUED | OUTPATIENT
Start: 2020-08-11 | End: 2020-08-11

## 2020-08-11 RX ORDER — OXYCODONE HYDROCHLORIDE 5 MG/1
5 TABLET ORAL ONCE
Refills: 0 | Status: DISCONTINUED | OUTPATIENT
Start: 2020-08-11 | End: 2020-08-11

## 2020-08-11 RX ORDER — SODIUM CHLORIDE 9 MG/ML
1000 INJECTION, SOLUTION INTRAVENOUS
Refills: 0 | Status: DISCONTINUED | OUTPATIENT
Start: 2020-08-11 | End: 2020-08-11

## 2020-08-11 RX ORDER — HYDROMORPHONE HYDROCHLORIDE 2 MG/ML
0.5 INJECTION INTRAMUSCULAR; INTRAVENOUS; SUBCUTANEOUS
Refills: 0 | Status: DISCONTINUED | OUTPATIENT
Start: 2020-08-11 | End: 2020-08-11

## 2020-08-11 RX ADMIN — OXYCODONE HYDROCHLORIDE 5 MILLIGRAM(S): 5 TABLET ORAL at 14:01

## 2020-08-11 RX ADMIN — HYDROMORPHONE HYDROCHLORIDE 0.5 MILLIGRAM(S): 2 INJECTION INTRAMUSCULAR; INTRAVENOUS; SUBCUTANEOUS at 13:48

## 2020-08-11 RX ADMIN — HYDROMORPHONE HYDROCHLORIDE 0.5 MILLIGRAM(S): 2 INJECTION INTRAMUSCULAR; INTRAVENOUS; SUBCUTANEOUS at 12:50

## 2020-08-11 RX ADMIN — HYDROMORPHONE HYDROCHLORIDE 0.5 MILLIGRAM(S): 2 INJECTION INTRAMUSCULAR; INTRAVENOUS; SUBCUTANEOUS at 13:21

## 2020-08-11 RX ADMIN — SODIUM CHLORIDE 75 MILLILITER(S): 9 INJECTION, SOLUTION INTRAVENOUS at 13:05

## 2020-08-11 RX ADMIN — HYDROMORPHONE HYDROCHLORIDE 0.5 MILLIGRAM(S): 2 INJECTION INTRAMUSCULAR; INTRAVENOUS; SUBCUTANEOUS at 13:38

## 2020-08-11 RX ADMIN — HYDROMORPHONE HYDROCHLORIDE 0.5 MILLIGRAM(S): 2 INJECTION INTRAMUSCULAR; INTRAVENOUS; SUBCUTANEOUS at 13:05

## 2020-08-11 RX ADMIN — SODIUM CHLORIDE 75 MILLILITER(S): 9 INJECTION, SOLUTION INTRAVENOUS at 07:24

## 2020-08-11 NOTE — ASU DISCHARGE PLAN (ADULT/PEDIATRIC) - CALL YOUR DOCTOR IF YOU HAVE ANY OF THE FOLLOWING:
Bleeding that does not stop/Swelling that gets worse/Nausea and vomiting that does not stop/Wound/Surgical Site with redness, or foul smelling discharge or pus/Increased irritability or sluggishness/Pain not relieved by Medications

## 2020-08-11 NOTE — ASU PREOP CHECKLIST - DNR CLARIFICATION FORM COMPLETED
What Type Of Note Output Would You Prefer (Optional)?: Standard Output How Severe Is Your Skin Lesion?: moderate Has Your Skin Lesion Been Treated?: not been treated Is This A New Presentation, Or A Follow-Up?: Skin Lesions n/a

## 2020-08-11 NOTE — ASU DISCHARGE PLAN (ADULT/PEDIATRIC) - ASU DC SPECIAL INSTRUCTIONSFT
Do not remove dressing.  Do not remove steri-strips.  Do not shower or bathe or wet dressing. Please wear surgi bra at all times until follow up visit.     No heavy lifting (nothing heavier than 5 lbs.), no strenuous physical activity, no exercise.      Do not drive for 24 hours after anesthesia.    Do not drive while taking narcotic pain medications.  Do not drive until pain free.      You may resume your usual daily diet as tolerated.      You may perform your usual daily tasks as tolerated.      Take Percocet as prescribed by Dr. Lindsey office for severe pain. Take antibiotics as prescribed to you.     Follow-up with Dr. Osvaldo Andrade in his office 8/13/2020- call office for appointment if not already made.

## 2020-08-13 LAB — SURGICAL PATHOLOGY STUDY: SIGNIFICANT CHANGE UP

## 2020-09-28 ENCOUNTER — RESULT REVIEW (OUTPATIENT)
Age: 35
End: 2020-09-28

## 2020-09-28 PROBLEM — N62 HYPERTROPHY OF BREAST: Chronic | Status: ACTIVE | Noted: 2020-08-05

## 2020-10-08 ENCOUNTER — APPOINTMENT (OUTPATIENT)
Dept: UROLOGY | Facility: CLINIC | Age: 35
End: 2020-10-08
Payer: COMMERCIAL

## 2020-10-08 VITALS
BODY MASS INDEX: 38.09 KG/M2 | HEIGHT: 63 IN | DIASTOLIC BLOOD PRESSURE: 80 MMHG | RESPIRATION RATE: 16 BRPM | SYSTOLIC BLOOD PRESSURE: 118 MMHG | WEIGHT: 215 LBS | HEART RATE: 80 BPM

## 2020-10-08 VITALS — TEMPERATURE: 97.9 F

## 2020-10-08 DIAGNOSIS — Z78.9 OTHER SPECIFIED HEALTH STATUS: ICD-10-CM

## 2020-10-08 PROCEDURE — 88112 CYTOPATH CELL ENHANCE TECH: CPT | Mod: 26

## 2020-10-08 PROCEDURE — 99214 OFFICE O/P EST MOD 30 MIN: CPT

## 2020-10-09 LAB
APPEARANCE: CLEAR
BACTERIA: NEGATIVE
BILIRUBIN URINE: NEGATIVE
BLOOD URINE: NEGATIVE
COLOR: YELLOW
GLUCOSE QUALITATIVE U: NEGATIVE
HYALINE CASTS: 0 /LPF
KETONES URINE: NEGATIVE
LEUKOCYTE ESTERASE URINE: NEGATIVE
MICROSCOPIC-UA: NORMAL
NITRITE URINE: NEGATIVE
PH URINE: 7
PROTEIN URINE: NEGATIVE
RED BLOOD CELLS URINE: 0 /HPF
SPECIFIC GRAVITY URINE: 1.02
SQUAMOUS EPITHELIAL CELLS: 1 /HPF
UROBILINOGEN URINE: NORMAL
WHITE BLOOD CELLS URINE: 0 /HPF

## 2020-10-10 LAB — BACTERIA UR CULT: NORMAL

## 2020-10-11 LAB — URINE CYTOLOGY: NORMAL

## 2020-10-17 ENCOUNTER — TRANSCRIPTION ENCOUNTER (OUTPATIENT)
Age: 35
End: 2020-10-17

## 2020-10-18 PROBLEM — Z78.9 NEVER SMOKED TOBACCO: Status: ACTIVE | Noted: 2020-10-18

## 2020-10-19 ENCOUNTER — APPOINTMENT (OUTPATIENT)
Dept: UROLOGY | Facility: CLINIC | Age: 35
End: 2020-10-19

## 2020-10-21 ENCOUNTER — APPOINTMENT (OUTPATIENT)
Dept: ORTHOPEDIC SURGERY | Facility: CLINIC | Age: 35
End: 2020-10-21
Payer: COMMERCIAL

## 2020-10-21 VITALS
SYSTOLIC BLOOD PRESSURE: 120 MMHG | OXYGEN SATURATION: 99 % | WEIGHT: 215 LBS | DIASTOLIC BLOOD PRESSURE: 86 MMHG | HEART RATE: 94 BPM | BODY MASS INDEX: 38.09 KG/M2 | HEIGHT: 63 IN

## 2020-10-21 PROCEDURE — 72110 X-RAY EXAM L-2 SPINE 4/>VWS: CPT

## 2020-10-21 PROCEDURE — 99072 ADDL SUPL MATRL&STAF TM PHE: CPT

## 2020-10-21 PROCEDURE — 99203 OFFICE O/P NEW LOW 30 MIN: CPT

## 2020-10-21 NOTE — ASSESSMENT
[FreeTextEntry1] : This is a 32-year-old female presents to the with low back and radicular type symptoms down bilateral legs.  Unfortunately this is worsening despite nonoperative modalities of care.  I would like to go forward with getting a lumbar MRI to ensure there is no nerve root impingement or lumbar spinal stenosis.  I have also prescribed her a formal course of physical therapy.  She was also given a prescription for short course of tizanidine.  She can take Tylenol and ibuprofen as needed for pain relief as well.  I will see her back in 2 to 3 weeks for repeat clinical evaluation to go over imaging.\par \par The patient has tried the following treatments:\par Activity modification          +\par Ice/Compression                  +\par Braces                                     -\par NSAIDS                                   +\par Physical Therapy                   -\par \par

## 2020-10-21 NOTE — PHYSICAL EXAM
[de-identified] : Lumbar Physical Exam\par \par Gait -normal\par \par Station -normal\par \par Sagittal balance -normal\par \par Compensatory mechanism? -None\par \par Heel walk -normal\par \par Toe walk -normal\par \par Reflexes\par Patellar -normal\par Gastroc -normal\par Clonus -no\par \par Hip Exam -normal\par \par Straight leg raise -positive bilaterally\par \par Pulses -2+ DP/PT\par \par Range of motion decreased globally\par \par Sensation \par Sensation intact to light touch in L1, L2, L3, L4, L5 and S1 dermatomes bilaterally\par \par Motor\par 	IP	Quad	HS	TA	Gastroc	EHL\par Right	5/5	5/5	5/5	5/5	5/5	5/5\par Left	5/5	5/5	5/5	5/5	5/5	5/5\par \par  [de-identified] : Lumbar radiographs\par  disc heights Relatively well-maintained\par No facet arthropathy\par Lumbar lordosis maintained

## 2020-10-21 NOTE — HISTORY OF PRESENT ILLNESS
[de-identified] : This is a 34-year-old female who comes in today complaining of low back pain worsening radicular type symptoms down bilateral legs.  The pain is worse on the right as compared to the left.  The pain goes down from her buttock to her posterior thigh anterior posterior calf bilaterally.  It hurts more when she is standing and walking.  At times she has numbness in her feet as well.  She has tried ibuprofen and Flexeril without any significant symptom relief as well as activity modifications without any symptom relief.

## 2020-10-23 ENCOUNTER — APPOINTMENT (OUTPATIENT)
Dept: UROLOGY | Facility: CLINIC | Age: 35
End: 2020-10-23
Payer: COMMERCIAL

## 2020-10-23 DIAGNOSIS — K59.00 CONSTIPATION, UNSPECIFIED: ICD-10-CM

## 2020-10-23 DIAGNOSIS — N39.46 MIXED INCONTINENCE: ICD-10-CM

## 2020-10-23 PROCEDURE — 99443: CPT

## 2020-10-23 NOTE — PHYSICAL EXAM
[General Appearance - Well Developed] : well developed [Normal Appearance] : normal appearance [General Appearance - In No Acute Distress] : no acute distress [Edema] : no peripheral edema [Respiration, Rhythm And Depth] : normal respiratory rhythm and effort [Exaggerated Use Of Accessory Muscles For Inspiration] : no accessory muscle use [Abdomen Soft] : soft [Abdomen Tenderness] : non-tender [Costovertebral Angle Tenderness] : no ~M costovertebral angle tenderness [Normal Station and Gait] : the gait and station were normal for the patient's age [Skin Color & Pigmentation] : normal skin color and pigmentation [] : no rash [No Focal Deficits] : no focal deficits [Oriented To Time, Place, And Person] : oriented to person, place, and time [Affect] : the affect was normal [Mood] : the mood was normal [Not Anxious] : not anxious [Cervical Lymph Nodes Enlarged Posterior Bilaterally] : posterior cervical [Cervical Lymph Nodes Enlarged Anterior Bilaterally] : anterior cervical [Supraclavicular Lymph Nodes Enlarged Bilaterally] : supraclavicular [FreeTextEntry1] : no spine tenderness.

## 2020-10-23 NOTE — ASSESSMENT
[FreeTextEntry1] : 10/08/2020: Ms. Perez is a 33y/o F presenting today for evaluation of urinary frequency and incontinence. Pt she has a constant dribble, leaking not only with urgency. Notes she also has slight stress urinary incontinence when she sneezes. Symptoms started after her first child 5 years ago. 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 Works as nurse in urology. \par \par Pt is not allergic to Augmentin, but gets oral thrush when she takes it. If needed she can take Cephalosporin so she can swallow it. \par \par The patient produced a urine sample which will be sent for urinalysis, urine cytology, and urine culture. \par \par PVR in office is 0mL. \par \par I prescribed the patient Imipramine 10 mg, one tablet at bedtime. I informed the patient in small dosages the medication is used to prevent children from bedwetting and in larger dosages, 150-200 mg, it is used to treat depression. I informed the patient there may be dry mouth and constipation as a side effect, and on rare occasion, fogginess and ataxia. Pt will take Colace 100mg BID to control constipation. \par \par RTO in 3 weeks for reassessment.

## 2020-10-23 NOTE — LETTER HEADER
[FreeTextEntry3] : Isma Diggs MD, PhD\par [facility]\par [address]\par [address] [place] [address]\[place] [address]

## 2020-10-23 NOTE — HISTORY OF PRESENT ILLNESS
[FreeTextEntry1] : 10/08/2020: Ms. Perez is a 35y/o F presenting today for evaluation of urinary frequency and incontinence. Pt she has a constant dribble, not only with urgency. Notes she also has slight stress urinary incontinence when sneezing. Symptoms started after her first child 5 years ago. 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 Works as nurse.

## 2020-10-23 NOTE — LETTER BODY
[Dear  ___] : Dear  [unfilled], [Courtesy Letter:] : I had the pleasure of seeing your patient, [unfilled], in my office today. [Please see my note below.] : Please see my note below. [Consult Closing:] : Thank you very much for allowing me to participate in the care of this patient.  If you have any questions, please do not hesitate to contact me. [Sincerely,] : Sincerely, [FreeTextEntry2] : Dr. Zoila Santoro\par [address] [place] [address] [address]\par [place] [id number] [FreeTextEntry1] : 10/08/2020: Ms. Perez is a 33y/o F presenting today for evaluation of urinary frequency and incontinence. Pt she has a constant dribble, leaking not only with urgency. Notes she also has slight stress urinary incontinence when she sneezes. Symptoms started after her first child 5 years ago. 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 Works as nurse in urology. \par \par Pt is not allergic to Augmentin, but gets oral thrush when she takes it. If needed she can take Cephalosporin so she can swallow it. \par \par The patient produced a urine sample which will be sent for urinalysis, urine cytology, and urine culture. \par \par PVR in office is 0mL. \par \par I prescribed the patient Imipramine 10 mg, one tablet at bedtime. I informed the patient in small dosages the medication is used to prevent children from bedwetting and in larger dosages, 150-200 mg, it is used to treat depression. I informed the patient there may be dry mouth and constipation as a side effect, and on rare occasion, fogginess and lightheadedness in the morning. Pt will take Colace 100mg BID to control constipation. \par \par RTO in 3 weeks for reassessment.  [FreeTextEntry3] : Isma Diggs MD, PhD

## 2020-10-23 NOTE — ADDENDUM
[FreeTextEntry1] : I, Marsha Abbottin, acted solely as a scribe for Dr. Isma Diggs on this date 10/08/2020.\par \par All medical record entries made by the Scribe were at my, Dr. Isma Diggs, direction and personally dictated by me on 10/08/2020. I have reviewed the chart and agree that the record accurately reflects my personal performance of the history, physical exam, assessment and plan.  I have also personally directed, reviewed and agreed with the chart.

## 2020-10-25 DIAGNOSIS — N39.41 URGE INCONTINENCE: ICD-10-CM

## 2020-10-25 PROBLEM — K59.00 CONSTIPATION: Status: ACTIVE | Noted: 2020-10-25

## 2020-10-25 PROBLEM — N39.46 MIXED STRESS AND URGE URINARY INCONTINENCE: Status: ACTIVE | Noted: 2020-10-08

## 2020-10-25 NOTE — HISTORY OF PRESENT ILLNESS
[FreeTextEntry1] : 10/08/2020: Ms. Perze is a 35y/o F presenting today for evaluation of urinary frequency and incontinence. Pt she has a constant dribble, not only with urgency. Notes she also has slight stress urinary incontinence when sneezing. Symptoms started after her first child 5 years ago. 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 Works as nurse.\par \par 10/23/2020: 10/23/2020:  gave permission for a telephone visit. She was reached at home at  [phone]. She only took the Imipramine for four days. It showed improvement in urgency, however she stopped it after experiencing vertigo while taking it. She denies having any significant stress incontinence. Her main complaint is urinary urgency and urgency incontinence. \par

## 2020-10-25 NOTE — HISTORY OF PRESENT ILLNESS
[FreeTextEntry1] : 10/08/2020: Ms. Perez is a 33y/o F presenting today for evaluation of urinary frequency and incontinence. Pt she has a constant dribble, not only with urgency. Notes she also has slight stress urinary incontinence when sneezing. Symptoms started after her first child 5 years ago. 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 Works as nurse.\par \par 10/23/2020: 10/23/2020:  gave permission for a telephone visit. She was reached at home at  [phone]. She only took the Imipramine for four days. It showed improvement in urgency, however she stopped it after experiencing vertigo while taking it. She denies having any significant stress incontinence. Her main complaint is urinary urgency and urgency incontinence. \par

## 2020-10-25 NOTE — ASSESSMENT
[FreeTextEntry1] : 10/08/2020: Ms. Perez is a 35y/o F presenting today for evaluation of urinary frequency and incontinence. Pt she has a constant dribble, leaking not only with urgency. Notes she also has slight stress urinary incontinence when she sneezes. Symptoms started after her first child 5 years ago. 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 If needed she can take Cephalosporin so she can swallow it. The patient produced a urine sample which will be sent for urinalysis, urine cytology, and urine culture. PVR in office is 0mL. \par \par I prescribed the patient Imipramine 10 mg, one tablet at bedtime. I informed the patient in small dosages the medication is used to prevent children from bedwetting and in larger dosages, 150-200 mg, it is used to treat depression. I informed the patient there may be dry mouth and constipation as a side effect, and on rare occasion, fogginess and ataxia. Pt will take Colace 100mg BID to control constipation. RTO in 3 weeks for reassessment. \par \par preparation, visit and coordination of care took 25 minutes.\par \par 10/23/2020: 10/23/2020:  gave permission for a telephone visit. She was reached at home at  [phone]. She only took the Imipramine for four days. It showed improvement in urgency, however she stopped it after experiencing vertigo while taking it. She denies having any significant stress incontinence. Her main complaint is urinary urgency and urgency incontinence. \par \par I am prescribing oxybutynin 10mg, once a day every day for urgency. I informed her of possible dry mouth and constipation for which she should drink a lot of water for and take sucking candies. She will follow up in two weeks.

## 2020-10-25 NOTE — ASSESSMENT
[FreeTextEntry1] : 10/08/2020: Ms. Perez is a 33y/o F presenting today for evaluation of urinary frequency and incontinence. Pt she has a constant dribble, leaking not only with urgency. Notes she also has slight stress urinary incontinence when she sneezes. Symptoms started after her first child 5 years ago. 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 If needed she can take Cephalosporin so she can swallow it. The patient produced a urine sample which will be sent for urinalysis, urine cytology, and urine culture. PVR in office is 0mL. \par \par I prescribed the patient Imipramine 10 mg, one tablet at bedtime. I informed the patient in small dosages the medication is used to prevent children from bedwetting and in larger dosages, 150-200 mg, it is used to treat depression. I informed the patient there may be dry mouth and constipation as a side effect, and on rare occasion, fogginess and ataxia. Pt will take Colace 100mg BID to control constipation. RTO in 3 weeks for reassessment. \par \par preparation, visit and coordination of care took 25 minutes.\par \par 10/23/2020: 10/23/2020:  gave permission for a telephone visit. She was reached at home at  [phone]. She only took the Imipramine for four days. It showed improvement in urgency, however she stopped it after experiencing vertigo while taking it. She denies having any significant stress incontinence. Her main complaint is urinary urgency and urgency incontinence. \par \par I am prescribing oxybutynin 10mg, once a day every day for urgency. I informed her of possible dry mouth and constipation for which she should drink a lot of water for and take sucking candies. She will follow up in two weeks.

## 2020-11-01 ENCOUNTER — APPOINTMENT (OUTPATIENT)
Dept: MRI IMAGING | Facility: CLINIC | Age: 35
End: 2020-11-01

## 2020-11-08 ENCOUNTER — APPOINTMENT (OUTPATIENT)
Dept: MRI IMAGING | Facility: CLINIC | Age: 35
End: 2020-11-08

## 2020-11-11 ENCOUNTER — APPOINTMENT (OUTPATIENT)
Dept: ORTHOPEDIC SURGERY | Facility: CLINIC | Age: 35
End: 2020-11-11

## 2021-01-14 NOTE — DISCHARGE NOTE OB - CARE PROVIDER_API CALL
Iza Crane)  Obstetrics and Gynecology  [address] [place] [address]  Phone: [phone]  Fax: [phone]  Follow Up Time:
Fall

## 2021-04-05 ENCOUNTER — APPOINTMENT (OUTPATIENT)
Dept: INTERNAL MEDICINE | Facility: CLINIC | Age: 36
End: 2021-04-05
Payer: COMMERCIAL

## 2021-04-05 VITALS
HEART RATE: 86 BPM | DIASTOLIC BLOOD PRESSURE: 71 MMHG | RESPIRATION RATE: 16 BRPM | BODY MASS INDEX: 39.16 KG/M2 | SYSTOLIC BLOOD PRESSURE: 104 MMHG | WEIGHT: 221.04 LBS | OXYGEN SATURATION: 99 % | TEMPERATURE: 98.1 F | HEIGHT: 63 IN

## 2021-04-05 DIAGNOSIS — R59.0 LOCALIZED ENLARGED LYMPH NODES: ICD-10-CM

## 2021-04-05 PROCEDURE — 99072 ADDL SUPL MATRL&STAF TM PHE: CPT

## 2021-04-05 PROCEDURE — 99203 OFFICE O/P NEW LOW 30 MIN: CPT

## 2021-04-05 NOTE — PHYSICAL EXAM
[Normal] : no acute distress, well nourished, well developed and well-appearing [Normal Axillary Nodes] : no axillary lymphadenopathy [Normal Posterior Cervical Nodes] : no posterior cervical lymphadenopathy [Normal Anterior Cervical Nodes] : no anterior cervical lymphadenopathy [de-identified] : +mobile, soft, nontender lymph node in left supraclavicular approx 1 cm

## 2021-04-05 NOTE — HISTORY OF PRESENT ILLNESS
[FreeTextEntry8] : 34 yo female c/o 1 day of painful to touch left side of neck with swelling.\par had covid vaccine moderna #1 dose on 4/1/21

## 2021-04-05 NOTE — PLAN
[FreeTextEntry1] : educated that most likely side effect of covid vaccine as lymphandenopathy can occur, give 1-2 weeks to resolve\par rto if lymph node hardens, discharge occurs, new or worsenign sx develop\par \par pt understands and agrees with above plan

## 2021-04-19 NOTE — ED ADULT NURSE NOTE - CAS EDN DISCHARGE ASSESSMENT
Alert and oriented to person, place and time/Awake Otezla Pregnancy And Lactation Text: This medication is Pregnancy Category C and it isn't known if it is safe during pregnancy. It is unknown if it is excreted in breast milk.

## 2021-05-16 NOTE — OB RN PATIENT PROFILE - PARENTS VERBALIZED UNDERSTANDING OF THE SAFE SKIN TO SKIN POSITIONING OF THE NEWBORN.
History  Chief Complaint   Patient presents with    Pain     PAIN IN ABD HEAD AND CHEST, STATES "I HAVE A HX OF MUSCLE PAIN BUT THIS TIME I CANT EVEN TOUCH IT"     Pleasant 29-year-old female presents here with a chief complaint of posterior and anterior chest wall discomfort  Reports certain movements really exacerbate the condition  She denies any specific trauma but has been spending most of her time on her feet and also performing a new job  She has reproducible tenderness over the left care scapular musculature  I think that she is experiencing radiculopathy          Prior to Admission Medications   Prescriptions Last Dose Informant Patient Reported? Taking?    LORazepam (ATIVAN) 0 5 mg tablet   Yes No   Sig: Take by mouth as needed for anxiety   acetaminophen (TYLENOL) 650 mg CR tablet   No No   Sig: Take 1 tablet by mouth every 8 (eight) hours as needed for mild pain   Patient not taking: Reported on 10/9/2020   cyclobenzaprine (FLEXERIL) 5 mg tablet   Yes No   escitalopram (LEXAPRO) 10 mg tablet   Yes No   Sig: Take 10 mg by mouth daily   methocarbamol (ROBAXIN) 750 mg tablet   No No   Sig: Take 1 tablet (750 mg total) by mouth every 6 (six) hours as needed for muscle spasms   metoprolol tartrate (LOPRESSOR) 25 mg tablet   Yes No   Sig: Take 25 mg by mouth 2 (two) times a day   rivaroxaban (Xarelto) 20 mg tablet   No No   Sig: Take 1 tablet (20 mg total) by mouth daily      Facility-Administered Medications: None       Past Medical History:   Diagnosis Date    Costochondritis     DVT, bilateral lower limbs ([address] )     Long term (current) use of anticoagulants for h/o DVT     Morbid (severe) obesity due to excess calories (HCC)     Palpitations     Tachycardia        Past Surgical History:   Procedure Laterality Date     SECTION      CHOLECYSTECTOMY      CHOLECYSTECTOMY LAPAROSCOPIC N/A 2020    Procedure: CHOLECYSTECTOMY LAPAROSCOPIC;  Surgeon: Christy Brown MD;  Location: [facility] OR;  Service: General    HYSTERECTOMY N/A 04/2019       No family history on file  I have reviewed and agree with the history as documented  E-Cigarette/Vaping    E-Cigarette Use Never User      E-Cigarette/Vaping Substances     Social History     Tobacco Use    Smoking status: Former Smoker    Smokeless tobacco: Never Used   Substance Use Topics    Alcohol use: Never     Frequency: Never    Drug use: No       Review of Systems   Constitutional: Negative for diaphoresis, fatigue and fever  HENT: Negative for congestion, ear pain, nosebleeds and sore throat  Eyes: Negative for photophobia, pain, discharge and visual disturbance  Respiratory: Negative for cough, choking, chest tightness, shortness of breath and wheezing  Cardiovascular: Positive for chest pain  Negative for palpitations  Gastrointestinal: Negative for abdominal distention, abdominal pain, diarrhea and vomiting  Genitourinary: Negative for dysuria, flank pain and frequency  Musculoskeletal: Positive for back pain  Negative for gait problem and joint swelling  Skin: Negative for color change and rash  Neurological: Negative for dizziness, syncope and headaches  Psychiatric/Behavioral: Negative for behavioral problems and confusion  The patient is not nervous/anxious  All other systems reviewed and are negative  Physical Exam  Physical Exam  Vitals signs and nursing note reviewed  Constitutional:       General: She is not in acute distress  Appearance: She is well-developed  She is not ill-appearing or toxic-appearing  HENT:      Head: Normocephalic and atraumatic  Mouth/Throat:      Dentition: Normal dentition  Eyes:      General:         Right eye: No discharge  Left eye: No discharge  Neck:      Musculoskeletal: Normal range of motion and neck supple  Cardiovascular:      Rate and Rhythm: Normal rate and regular rhythm     Pulmonary:      Effort: Pulmonary effort is normal  No accessory muscle usage or respiratory distress  Abdominal:      General: There is no distension  Tenderness: There is no guarding  Musculoskeletal: Normal range of motion  Cervical back: She exhibits tenderness, pain and spasm  Back:    Skin:     General: Skin is warm and dry  Neurological:      Mental Status: She is alert and oriented to person, place, and time  Coordination: Coordination normal    Psychiatric:         Behavior: Behavior is cooperative  Vital Signs  ED Triage Vitals [05/16/21 1036]   Temperature Pulse Respirations Blood Pressure SpO2   98 °F (36 7 °C) 102 18 136/83 99 %      Temp Source Heart Rate Source Patient Position - Orthostatic VS BP Location FiO2 (%)   Oral Monitor Lying Right arm --      Pain Score       Worst Possible Pain           Vitals:    05/16/21 1036   BP: 136/83   Pulse: 102   Patient Position - Orthostatic VS: Lying         Visual Acuity      ED Medications  Medications   HYDROcodone-acetaminophen (NORCO) 5-325 mg per tablet 1 tablet (1 tablet Oral Given 5/16/21 1132)       Diagnostic Studies  Results Reviewed     None                 No orders to display              Procedures  Procedures         ED Course                             SBIRT 20yo+      Most Recent Value   SBIRT (24 yo +)   In order to provide better care to our patients, we are screening all of our patients for alcohol and drug use  Would it be okay to ask you these screening questions?   No Filed at: 05/16/2021 1039                    MDM  Number of Diagnoses or Management Options  Radicular pain: new and requires workup  Upper back pain: new and requires workup     Amount and/or Complexity of Data Reviewed  Independent visualization of images, tracings, or specimens: yes    Patient Progress  Patient progress: stable      Disposition  Final diagnoses:   Upper back pain   Radicular pain     Time reflects when diagnosis was documented in both MDM as applicable and the Disposition within this note     Time User Action Codes Description Comment    5/16/2021 11:21 AM Cledanuta Ropera Add [M54 9] Upper back pain     5/16/2021 11:23 AM Cledanuta Ambriz Add [M54 10] Radicular pain       ED Disposition     ED Disposition Condition Date/Time Comment    Discharge Stable Burr May 16, 2021 11:21 AM Jez Mitcehll discharge to home/self care              Follow-up Information     Follow up With Specialties Details Why Contact Info Additional Information    [facility] Physical Therapy Go to   [phone]          Discharge Medication List as of 5/16/2021 11:28 AM      START taking these medications    Details   HYDROcodone-acetaminophen (NORCO) 5-325 mg per tablet Take 1 tablet by mouth every 6 (six) hours as needed for pain for up to 12 dosesMax Daily Amount: 4 tablets, Starting Sun 5/16/2021, Normal      naproxen (NAPROSYN) 500 mg tablet Take 1 tablet (500 mg total) by mouth 2 (two) times a day with meals, Starting Sun 5/16/2021, Until Tue 6/15/2021, Normal         CONTINUE these medications which have NOT CHANGED    Details   acetaminophen (TYLENOL) 650 mg CR tablet Take 1 tablet by mouth every 8 (eight) hours as needed for mild pain, Starting Tue 7/4/2017, Print      cyclobenzaprine (FLEXERIL) 5 mg tablet Starting Tue 7/7/2020, Historical Med      escitalopram (LEXAPRO) 10 mg tablet Take 10 mg by mouth daily, Starting Wed 6/24/2020, Until Thu 6/24/2021, Historical Med      LORazepam (ATIVAN) 0 5 mg tablet Take by mouth as needed for anxiety, Historical Med      methocarbamol (ROBAXIN) 750 mg tablet Take 1 tablet (750 mg total) by mouth every 6 (six) hours as needed for muscle spasms, Starting Mon 11/23/2020, Print      metoprolol tartrate (LOPRESSOR) 25 mg tablet Take 25 mg by mouth 2 (two) times a day, Until Discontinued, Historical Med      rivaroxaban (Xarelto) 20 mg tablet Take 1 tablet (20 mg total) by mouth daily, Starting Mon 12/28/2020, Print               PDMP Review None          ED Provider  Electronically Signed by           OLAMIDE Hanson  05/16/21 6617 Statement Selected

## 2021-05-18 NOTE — DISCHARGE NOTE OB - NS OB DC IMMUNIZATIONS2 YN
Mom called back and said to go ahead and throw the ball away as pt has plenty of toys  And thanks for calling   Message confirmed with caller.   Routed to providers clinical pool.     No

## 2021-06-14 NOTE — OB PROVIDER H&P - PMH
Dr Treadwell has ordered a home sleep study for you to be done as soon as possible.     Please stop your sleep machine the night before the sleep study and do not use your sleep machine the night of your sleep test  
Anemia  pt states she was informed by her GYN that she may have thalassemia due to results of recent blood work. Pt was to follow up for additional blood work but has not gone yet  Missed ab    Spontaneous     Termination of pregnancy (fetus)    Vitamin D deficiency

## 2021-09-27 NOTE — ASU PREOP CHECKLIST - NEEDLE GAUGE
20

## 2021-10-08 NOTE — ED CLERICAL - DIVISION
[place].. 73 year old with COVID pneumonia 8/12 s/p tocilizumab, remdesivir and two courses of decadron.     Cardiac arrest 8/20.     Prolonged fevers since 9/8 despite treating potential secondary infections.     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     Proctitis - CT 9/23- completed course of unasyn    Now afebrile for the past few days.     Poor long term prognosis. PEG 9/30. Trach 10/1.     1) Staph epi bacteremia  -repeat t blood cultures without growth  -repeat TTE if SEJAL is not planned   - Daptomycin 700mg IV q24h to see if it has any effect on blood cultures  -weekly CPK while on Daptomycin   - plan 6 weeks of antibiotics from time of negative cultures    2) Fever  -Typically would get SEJAL, but he is high risk for procedure and unlikely to tolerate aggressive interventions  -repeat TTE in two weeks  -Could consider CT CAP and Tagged wbc.   This testing would be part of work up of bacteremia of unknown focus.  But again not clear that he could tolerate aggressive interventions.  Not clear they would change his course    3) Elevated alk phos/ elevated total bili  Though to be secondary to COVID/ ? COVID cholangiopathy  Consider repeat RUQ sonogram    Overall, prognosis is poor.  He has secondary infection.  Given his comorbidity and functional status, he remains at high risk for recurrent infection.  -supportive care   -goals of care per primary team and palliative      Call the ID service with questions or concerns over the weekend.  [phone]

## 2021-11-23 ENCOUNTER — RESULT REVIEW (OUTPATIENT)
Age: 36
End: 2021-11-23

## 2022-03-28 ENCOUNTER — RESULT REVIEW (OUTPATIENT)
Age: 37
End: 2022-03-28

## 2022-03-30 ENCOUNTER — NON-APPOINTMENT (OUTPATIENT)
Age: 37
End: 2022-03-30

## 2022-04-01 ENCOUNTER — EMERGENCY (EMERGENCY)
Facility: HOSPITAL | Age: 37
LOS: 1 days | Discharge: HOME | End: 2022-04-01
Attending: STUDENT IN AN ORGANIZED HEALTH CARE EDUCATION/TRAINING PROGRAM | Admitting: STUDENT IN AN ORGANIZED HEALTH CARE EDUCATION/TRAINING PROGRAM
Payer: COMMERCIAL

## 2022-04-01 VITALS
DIASTOLIC BLOOD PRESSURE: 76 MMHG | HEART RATE: 86 BPM | SYSTOLIC BLOOD PRESSURE: 114 MMHG | TEMPERATURE: 98 F | OXYGEN SATURATION: 100 % | RESPIRATION RATE: 20 BRPM

## 2022-04-01 VITALS
RESPIRATION RATE: 18 BRPM | HEART RATE: 101 BPM | SYSTOLIC BLOOD PRESSURE: 129 MMHG | OXYGEN SATURATION: 99 % | DIASTOLIC BLOOD PRESSURE: 81 MMHG | HEIGHT: 63 IN | TEMPERATURE: 98 F

## 2022-04-01 DIAGNOSIS — Z98.891 HISTORY OF UTERINE SCAR FROM PREVIOUS SURGERY: Chronic | ICD-10-CM

## 2022-04-01 DIAGNOSIS — Z33.2 ENCOUNTER FOR ELECTIVE TERMINATION OF PREGNANCY: Chronic | ICD-10-CM

## 2022-04-01 DIAGNOSIS — Z98.890 OTHER SPECIFIED POSTPROCEDURAL STATES: Chronic | ICD-10-CM

## 2022-04-01 DIAGNOSIS — Z98.51 TUBAL LIGATION STATUS: Chronic | ICD-10-CM

## 2022-04-01 DIAGNOSIS — Z90.49 ACQUIRED ABSENCE OF OTHER SPECIFIED PARTS OF DIGESTIVE TRACT: Chronic | ICD-10-CM

## 2022-04-01 LAB
ALBUMIN SERPL ELPH-MCNC: 4.2 G/DL — SIGNIFICANT CHANGE UP (ref 3.3–5)
ALP SERPL-CCNC: 86 U/L — SIGNIFICANT CHANGE UP (ref 40–120)
ALT FLD-CCNC: 20 U/L — SIGNIFICANT CHANGE UP (ref 4–33)
ANION GAP SERPL CALC-SCNC: 14 MMOL/L — SIGNIFICANT CHANGE UP (ref 7–14)
AST SERPL-CCNC: 23 U/L — SIGNIFICANT CHANGE UP (ref 4–32)
BASOPHILS # BLD AUTO: 0.02 K/UL — SIGNIFICANT CHANGE UP (ref 0–0.2)
BASOPHILS NFR BLD AUTO: 0.3 % — SIGNIFICANT CHANGE UP (ref 0–2)
BILIRUB SERPL-MCNC: 0.3 MG/DL — SIGNIFICANT CHANGE UP (ref 0.2–1.2)
BUN SERPL-MCNC: 12 MG/DL — SIGNIFICANT CHANGE UP (ref 7–23)
CALCIUM SERPL-MCNC: 9.1 MG/DL — SIGNIFICANT CHANGE UP (ref 8.4–10.5)
CHLORIDE SERPL-SCNC: 101 MMOL/L — SIGNIFICANT CHANGE UP (ref 98–107)
CO2 SERPL-SCNC: 22 MMOL/L — SIGNIFICANT CHANGE UP (ref 22–31)
CREAT SERPL-MCNC: 0.87 MG/DL — SIGNIFICANT CHANGE UP (ref 0.5–1.3)
EGFR: 90 ML/MIN/1.73M2 — SIGNIFICANT CHANGE UP
EOSINOPHIL # BLD AUTO: 0.15 K/UL — SIGNIFICANT CHANGE UP (ref 0–0.5)
EOSINOPHIL NFR BLD AUTO: 2.4 % — SIGNIFICANT CHANGE UP (ref 0–6)
FLUAV AG NPH QL: SIGNIFICANT CHANGE UP
FLUBV AG NPH QL: SIGNIFICANT CHANGE UP
GLUCOSE SERPL-MCNC: 81 MG/DL — SIGNIFICANT CHANGE UP (ref 70–99)
HCG SERPL-ACNC: <5 MIU/ML — SIGNIFICANT CHANGE UP
HCT VFR BLD CALC: 39.2 % — SIGNIFICANT CHANGE UP (ref 34.5–45)
HGB BLD-MCNC: 12.5 G/DL — SIGNIFICANT CHANGE UP (ref 11.5–15.5)
IANC: 4.21 K/UL — SIGNIFICANT CHANGE UP (ref 1.8–7.4)
IMM GRANULOCYTES NFR BLD AUTO: 0.3 % — SIGNIFICANT CHANGE UP (ref 0–1.5)
LIDOCAIN IGE QN: 27 U/L — SIGNIFICANT CHANGE UP (ref 7–60)
LYMPHOCYTES # BLD AUTO: 1.36 K/UL — SIGNIFICANT CHANGE UP (ref 1–3.3)
LYMPHOCYTES # BLD AUTO: 21.9 % — SIGNIFICANT CHANGE UP (ref 13–44)
MAGNESIUM SERPL-MCNC: 1.9 MG/DL — SIGNIFICANT CHANGE UP (ref 1.6–2.6)
MCHC RBC-ENTMCNC: 24.5 PG — LOW (ref 27–34)
MCHC RBC-ENTMCNC: 31.9 GM/DL — LOW (ref 32–36)
MCV RBC AUTO: 76.9 FL — LOW (ref 80–100)
MONOCYTES # BLD AUTO: 0.44 K/UL — SIGNIFICANT CHANGE UP (ref 0–0.9)
MONOCYTES NFR BLD AUTO: 7.1 % — SIGNIFICANT CHANGE UP (ref 2–14)
NEUTROPHILS # BLD AUTO: 4.21 K/UL — SIGNIFICANT CHANGE UP (ref 1.8–7.4)
NEUTROPHILS NFR BLD AUTO: 68 % — SIGNIFICANT CHANGE UP (ref 43–77)
NRBC # BLD: 0 /100 WBCS — SIGNIFICANT CHANGE UP
NRBC # FLD: 0 K/UL — SIGNIFICANT CHANGE UP
NT-PROBNP SERPL-SCNC: 18 PG/ML — SIGNIFICANT CHANGE UP
PLATELET # BLD AUTO: 358 K/UL — SIGNIFICANT CHANGE UP (ref 150–400)
POTASSIUM SERPL-MCNC: 3.9 MMOL/L — SIGNIFICANT CHANGE UP (ref 3.5–5.3)
POTASSIUM SERPL-SCNC: 3.9 MMOL/L — SIGNIFICANT CHANGE UP (ref 3.5–5.3)
PROT SERPL-MCNC: 8.3 G/DL — SIGNIFICANT CHANGE UP (ref 6–8.3)
RBC # BLD: 5.1 M/UL — SIGNIFICANT CHANGE UP (ref 3.8–5.2)
RBC # FLD: 14.6 % — HIGH (ref 10.3–14.5)
RSV RNA NPH QL NAA+NON-PROBE: SIGNIFICANT CHANGE UP
SARS-COV-2 RNA SPEC QL NAA+PROBE: SIGNIFICANT CHANGE UP
SODIUM SERPL-SCNC: 137 MMOL/L — SIGNIFICANT CHANGE UP (ref 135–145)
TROPONIN T, HIGH SENSITIVITY RESULT: <6 NG/L — SIGNIFICANT CHANGE UP
WBC # BLD: 6.2 K/UL — SIGNIFICANT CHANGE UP (ref 3.8–10.5)
WBC # FLD AUTO: 6.2 K/UL — SIGNIFICANT CHANGE UP (ref 3.8–10.5)

## 2022-04-01 PROCEDURE — 93010 ELECTROCARDIOGRAM REPORT: CPT

## 2022-04-01 PROCEDURE — 99285 EMERGENCY DEPT VISIT HI MDM: CPT | Mod: 25

## 2022-04-01 PROCEDURE — 71275 CT ANGIOGRAPHY CHEST: CPT | Mod: 26,MA

## 2022-04-01 RX ORDER — KETOROLAC TROMETHAMINE 30 MG/ML
15 SYRINGE (ML) INJECTION ONCE
Refills: 0 | Status: DISCONTINUED | OUTPATIENT
Start: 2022-04-01 | End: 2022-04-01

## 2022-04-01 RX ORDER — ACETAMINOPHEN 500 MG
1000 TABLET ORAL ONCE
Refills: 0 | Status: COMPLETED | OUTPATIENT
Start: 2022-04-01 | End: 2022-04-01

## 2022-04-01 RX ADMIN — Medication 400 MILLIGRAM(S): at 19:16

## 2022-04-01 RX ADMIN — Medication 15 MILLIGRAM(S): at 21:54

## 2022-04-01 NOTE — ED PROVIDER NOTE - NSFOLLOWUPINSTRUCTIONS_ED_ALL_ED_FT
1) Please follow up with your Primary Care Provider in 24-48 hours  2) Seek immediate medical care for any new or returning symptoms including but not limited severe pain, high fevers, difficulty breathing  3) Take Tylenol 650 mg every 4-6 hours as needed for pain. Do not take more than 2 grams within a 24 hour period  4) Take Ibuprofen 400-600 mg every 4-6 hours as needed for pain. Do not take more than 1200 mg within a 24 hour period. Take this medication with food

## 2022-04-01 NOTE — ED PROVIDER NOTE - OBJECTIVE STATEMENT
37 yo f pmh elevated bmi, s/p gastric sleeve one week prior, pw cp. reports one day of sx. sharp, anterior, radiates to left arm, intermittent, unk exacerbating/remitting sx, no prior hx of similar sx. 37 yo f pmh elevated bmi, s/p gastric sleeve one week prior, pw cp. reports one day of sx. sharp, anterior, radiates to left arm, intermittent, unk exacerbating/remitting sx, no prior hx of similar sx. no fam hx of acs/dvt/pe. denies leg swelling. denies sob.

## 2022-04-01 NOTE — ED ADULT NURSE NOTE - PAIN RATING/NUMBER SCALE (0-10): REST
Pharmacist Discharge Medication Reconciliation    Significant PMH:   Past Medical History:   Diagnosis Date    Chronic kidney disease     Hypertension     Liver disease      Encounter Diagnoses:   Encounter Diagnoses   Name Primary? Intractable abdominal pain Yes    Nausea and vomiting, intractability of vomiting not specified, unspecified vomiting type     Gastritis and duodenitis     ESRD (end stage renal disease) on dialysis ([facility])      Allergies: Baclofen    Discharge Medications:   Current Discharge Medication List        START taking these medications    Details   pantoprazole (Protonix) 40 mg tablet Take 1 Tablet by mouth two (2) times a day. Indications: an ulcer of the duodenum  Qty: 60 Tablet, Refills: 0  Start date: 3/25/2022      sucralfate (CARAFATE) 1 gram tablet Take 1 Tablet by mouth Before breakfast, lunch, dinner and at bedtime for 7 days. Qty: 28 Tablet, Refills: 0  Start date: 3/25/2022, End date: 4/1/2022           CONTINUE these medications which have NOT CHANGED    Details   carvediloL (COREG) 25 mg tablet Take 1 Tablet by mouth two (2) times daily (with meals) for 90 days. Qty: 60 Tablet, Refills: 2      bumetanide (BUMEX) 2 mg tablet Take 1 Tablet by mouth daily for 90 days. Qty: 30 Tablet, Refills: 2      doxazosin (CARDURA) 4 mg tablet Take 1 Tablet by mouth daily for 90 days. Qty: 30 Tablet, Refills: 2      NIFEdipine ER (PROCARDIA XL) 60 mg ER tablet Take 1 Tablet by mouth two (2) times a day for 90 days. Qty: 60 Tablet, Refills: 2      cloNIDine HCL (CATAPRES) 0.2 mg tablet Take 0.2 mg by mouth three (3) times daily. hydrALAZINE (APRESOLINE) 100 mg tablet Take 100 mg by mouth three (3) times daily. ascorbic acid, vitamin C, (Vitamin C) 500 mg tablet Take 500 mg by mouth daily. cholecalciferol (VITAMIN D3) 25 mcg (1,000 unit) cap Take 1,000 Units by mouth daily.       naloxone (Narcan) 4 mg/actuation nasal spray 1 [place] by IntraNASal route once as needed for Overdose. Use 1 spray intranasally, then discard. Repeat with new spray every 2 min as needed for opioid overdose symptoms, alternating nostrils. buprenorphine-naloxone (Suboxone) 8-2 mg film sublingaul film 1 Film by SubLINGual route two (2) times a day. Comments: . STOP taking these medications       potassium chloride SR (K-TAB) 20 mEq tablet Comments:   Reason for Stopping:               The patient's chart, MAR and AVS were reviewed by Sadie Robertson, [facility].     Discharging Provider: Steffanie Rothman MD    Thank you,     Sadie Robertson, [facility] 5

## 2022-04-01 NOTE — ED PROVIDER NOTE - NS ED ROS FT
GENERAL: No fever or chills, //             EYES: no change in vision, //             HEENT: no trouble swallowing or speaking, //             CARDIAC: chest pain, //              PULMONARY: no cough or SOB, //             GI: no abdominal pain, no nausea or no vomiting, no diarrhea or constipation, //             : No changes in urination,  //            SKIN: no rashes,  //            NEURO: no headache,  //             MSK: No joint pain otherwise as HPI or negative. ~Dominguez Huang, DO

## 2022-04-01 NOTE — ED PROVIDER NOTE - PROGRESS NOTE DETAILS
Dominguez Huang DO: Patient reassessed, NAD, non-toxic appearing. results dw pt, questions answered. rp vs improved. pt desires dc, to fu outpatient.

## 2022-04-01 NOTE — ED PROVIDER NOTE - CLINICAL SUMMARY MEDICAL DECISION MAKING FREE TEXT BOX
Dominguez Huang, DO: 37 yo f pmh elevated bmi, s/p gastric sleeve one week prior, pw cp. reports one day of sx. sharp, anterior, radiates to left arm, intermittent, unk exacerbating/remitting sx, no prior hx of similar sx. no fam hx of acs/dvt/pe. denies leg swelling. denies sob. arrives hds, well appearing, PE as noted. unlikely acs, dvt/pe, aortic catastrophe. given recent procedure dimer unlikely to assist. will obtain cta, labs, reassess. ekg nsr, non ischemic.

## 2022-04-01 NOTE — ED PROVIDER NOTE - PATIENT PORTAL LINK FT
You can access the FollowMyHealth Patient Portal offered by [facility] by registering at the following website: [email or web address]. By joining [employer]’s FollowMyHealth portal, you will also be able to view your health information using other applications (apps) compatible with our system.

## 2022-04-01 NOTE — ED ADULT TRIAGE NOTE - CHIEF COMPLAINT QUOTE
Pt AOX4 c/o chest pain; pt had gastric sleeve surgery at [place] x 5 days ago; feels normal post-op pain at area of surgery; denies SOB; sent by Surgeon Dr. Nieto

## 2022-04-01 NOTE — ED ADULT NURSE NOTE - CHIEF COMPLAINT QUOTE
Pt AOX4 c/o chest pain; pt had gastric sleeve surgery at [place] x 5 days ago; feels normal post-op pain at area of surgery; denies SOB; sent by Surgeon Dr. Nieto

## 2022-04-01 NOTE — ED PROVIDER NOTE - PRINCIPAL DIAGNOSIS
Griseofulvin Pregnancy And Lactation Text: This medication is Pregnancy Category X and is known to cause serious birth defects. It is unknown if this medication is excreted in breast milk but breast feeding should be avoided. Chest pain of uncertain etiology

## 2022-06-22 ENCOUNTER — APPOINTMENT (OUTPATIENT)
Dept: ORTHOPEDIC SURGERY | Facility: CLINIC | Age: 37
End: 2022-06-22
Payer: COMMERCIAL

## 2022-06-22 VITALS — BODY MASS INDEX: 31.41 KG/M2 | HEIGHT: 64 IN | WEIGHT: 184 LBS

## 2022-06-22 PROCEDURE — 99203 OFFICE O/P NEW LOW 30 MIN: CPT

## 2022-06-22 PROCEDURE — 73564 X-RAY EXAM KNEE 4 OR MORE: CPT | Mod: LT

## 2022-06-23 NOTE — ADDENDUM
[FreeTextEntry1] : This note was written by Coretta López on 06/22/2022 acting solely as a scribe for Dr. Nehemias Herbert.\par \par All medical record entries made by the Scribe were at my, Dr. Nehemias Herbert, direction and personally dictated by me on 06/22/2022. I have personally reviewed the chart and agree that the record accurately reflects my personal performance of the history, physical exam, assessment and plan.

## 2022-06-23 NOTE — PHYSICAL EXAM
[de-identified] : Oriented to time, place, person\par Mood: Normal\par Affect: Normal\par Appearance: Healthy, well appearing, no acute distress.\par Gait: Normal\par Assistive Devices: None\par \par Left Knee Exam:\par \par Skin: Clean, dry, intact\par Inspection: No obvious malalignment, no masses, no swelling, no effusion\par Pulses: 2+ DP/PT pulses \par ROM: 0-135 degrees of flexion. + pain with deep knee flexion/extension.\par Tenderness: No MJLT. No LJLT. +distal MCL ttp. No pain over the patella facets. No pain to the quadriceps tendon. No pain to the patella tendon. No posterior knee tenderness.\par Stability: Stable to varus, pain with valgus stress. Negative Lachman testing. Negative anterior drawer, negative posterior drawer.\par Strength: 5/5 Q/H/TA/GS/EHL, without atrophy\par Neuro: Intact to light touch throughout, DTRs normal\par Additional Tests: Negative Cyndi's test, Negative patellar grind test  [de-identified] : The following radiographs were ordered and read by me during this patients visit. I reviewed each radiograph in detail with the patient and discussed the findings as highlighted below. \par \par 4 views of the left knee were obtained today, 06/22/2022, that show no acute fracture or dislocation. There is no medial, no lateral and no patellofemoral degenerative changes seen. There is no significant malalignment. No significant other obvious osseous abnormality, otherwise unremarkable.

## 2022-06-23 NOTE — HISTORY OF PRESENT ILLNESS
[de-identified] : 36 year old female presents today with left knee pain x 2 weeks. She thinks twisted her knee/ buckled while walking. She was seen at [facility] and x-rays were negative for fx. The pain is intermittent can be triggered by any sudden movement. Pain is worse when laying down. Taking Advil pain. Denies buckling, catching ,or locking. \par \par The patient's past medical history, past surgical history, medications and allergies were reviewed by me today with the patient and documented accordingly. In addition, the patient's family and social history, which were noncontributory to this visit, were reviewed also.

## 2022-06-23 NOTE — DISCUSSION/SUMMARY
[de-identified] : 35 y/o female with left knee pain. \par \par Patient presents for evaluation of left knee pain. The patient's symptoms are consistent with possible low-grade MCL sprain through the medial compartment of the knee.Discussed with the patient in detail the concern for low-grade injury to the MCL and possible treatment options that may include conservative management (e.g. RICE, activity modification, PT, injection therapy).  The does not appear to be any medial meniscal symptoms, but given persistent symptoms we discussed utility of MRI imaging.  MRI may differentiate between low-grade sprain of the medial collateral ligament as well as in any internal derangement.  Patient agreeable\par \par Recommendation: Rest, ice, compression, elevation (RICE) and OTC NSAID's as instructed until MRI imaging.\par \par Follow up after MRI.

## 2022-07-16 ENCOUNTER — NON-APPOINTMENT (OUTPATIENT)
Age: 37
End: 2022-07-16

## 2022-07-24 ENCOUNTER — NON-APPOINTMENT (OUTPATIENT)
Age: 37
End: 2022-07-24

## 2022-11-23 ENCOUNTER — EMERGENCY (EMERGENCY)
Facility: HOSPITAL | Age: 37
LOS: 1 days | Discharge: HOME | End: 2022-11-23
Attending: EMERGENCY MEDICINE | Admitting: EMERGENCY MEDICINE

## 2022-11-23 VITALS
TEMPERATURE: 101 F | OXYGEN SATURATION: 100 % | HEART RATE: 117 BPM | DIASTOLIC BLOOD PRESSURE: 69 MMHG | RESPIRATION RATE: 16 BRPM | SYSTOLIC BLOOD PRESSURE: 107 MMHG

## 2022-11-23 DIAGNOSIS — Z98.51 TUBAL LIGATION STATUS: Chronic | ICD-10-CM

## 2022-11-23 DIAGNOSIS — Z98.890 OTHER SPECIFIED POSTPROCEDURAL STATES: Chronic | ICD-10-CM

## 2022-11-23 DIAGNOSIS — Z90.49 ACQUIRED ABSENCE OF OTHER SPECIFIED PARTS OF DIGESTIVE TRACT: Chronic | ICD-10-CM

## 2022-11-23 DIAGNOSIS — Z33.2 ENCOUNTER FOR ELECTIVE TERMINATION OF PREGNANCY: Chronic | ICD-10-CM

## 2022-11-23 DIAGNOSIS — Z98.891 HISTORY OF UTERINE SCAR FROM PREVIOUS SURGERY: Chronic | ICD-10-CM

## 2022-11-23 LAB
ALBUMIN SERPL ELPH-MCNC: 4.1 G/DL — SIGNIFICANT CHANGE UP (ref 3.3–5)
ALP SERPL-CCNC: 65 U/L — SIGNIFICANT CHANGE UP (ref 40–120)
ALT FLD-CCNC: 5 U/L — SIGNIFICANT CHANGE UP (ref 4–33)
ANION GAP SERPL CALC-SCNC: 12 MMOL/L — SIGNIFICANT CHANGE UP (ref 7–14)
APPEARANCE UR: ABNORMAL
AST SERPL-CCNC: 14 U/L — SIGNIFICANT CHANGE UP (ref 4–32)
B PERT DNA SPEC QL NAA+PROBE: SIGNIFICANT CHANGE UP
B PERT+PARAPERT DNA PNL SPEC NAA+PROBE: SIGNIFICANT CHANGE UP
BACTERIA # UR AUTO: ABNORMAL
BASE EXCESS BLDV CALC-SCNC: -2.4 MMOL/L — LOW (ref -2–3)
BASOPHILS # BLD AUTO: 0.02 K/UL — SIGNIFICANT CHANGE UP (ref 0–0.2)
BASOPHILS NFR BLD AUTO: 0.3 % — SIGNIFICANT CHANGE UP (ref 0–2)
BILIRUB SERPL-MCNC: 0.3 MG/DL — SIGNIFICANT CHANGE UP (ref 0.2–1.2)
BILIRUB UR-MCNC: NEGATIVE — SIGNIFICANT CHANGE UP
BLOOD GAS VENOUS COMPREHENSIVE RESULT: SIGNIFICANT CHANGE UP
BORDETELLA PARAPERTUSSIS (RAPRVP): SIGNIFICANT CHANGE UP
BUN SERPL-MCNC: 7 MG/DL — SIGNIFICANT CHANGE UP (ref 7–23)
C PNEUM DNA SPEC QL NAA+PROBE: SIGNIFICANT CHANGE UP
CALCIUM SERPL-MCNC: 9.1 MG/DL — SIGNIFICANT CHANGE UP (ref 8.4–10.5)
CHLORIDE BLDV-SCNC: 101 MMOL/L — SIGNIFICANT CHANGE UP (ref 96–108)
CHLORIDE SERPL-SCNC: 102 MMOL/L — SIGNIFICANT CHANGE UP (ref 98–107)
CO2 BLDV-SCNC: 24.5 MMOL/L — SIGNIFICANT CHANGE UP (ref 22–26)
CO2 SERPL-SCNC: 22 MMOL/L — SIGNIFICANT CHANGE UP (ref 22–31)
COLOR SPEC: YELLOW — SIGNIFICANT CHANGE UP
CREAT SERPL-MCNC: 0.76 MG/DL — SIGNIFICANT CHANGE UP (ref 0.5–1.3)
DIFF PNL FLD: NEGATIVE — SIGNIFICANT CHANGE UP
EGFR: 105 ML/MIN/1.73M2 — SIGNIFICANT CHANGE UP
EOSINOPHIL # BLD AUTO: 0.07 K/UL — SIGNIFICANT CHANGE UP (ref 0–0.5)
EOSINOPHIL NFR BLD AUTO: 1.2 % — SIGNIFICANT CHANGE UP (ref 0–6)
EPI CELLS # UR: SIGNIFICANT CHANGE UP
FLUAV H3 RNA SPEC QL NAA+PROBE: DETECTED
FLUBV RNA SPEC QL NAA+PROBE: SIGNIFICANT CHANGE UP
GAS PNL BLDV: 132 MMOL/L — LOW (ref 136–145)
GAS PNL BLDV: SIGNIFICANT CHANGE UP
GLUCOSE BLDV-MCNC: 84 MG/DL — SIGNIFICANT CHANGE UP (ref 70–99)
GLUCOSE SERPL-MCNC: 84 MG/DL — SIGNIFICANT CHANGE UP (ref 70–99)
GLUCOSE UR QL: NEGATIVE — SIGNIFICANT CHANGE UP
HADV DNA SPEC QL NAA+PROBE: SIGNIFICANT CHANGE UP
HCO3 BLDV-SCNC: 23 MMOL/L — SIGNIFICANT CHANGE UP (ref 22–29)
HCOV 229E RNA SPEC QL NAA+PROBE: SIGNIFICANT CHANGE UP
HCOV HKU1 RNA SPEC QL NAA+PROBE: SIGNIFICANT CHANGE UP
HCOV NL63 RNA SPEC QL NAA+PROBE: SIGNIFICANT CHANGE UP
HCOV OC43 RNA SPEC QL NAA+PROBE: SIGNIFICANT CHANGE UP
HCT VFR BLD CALC: 35.4 % — SIGNIFICANT CHANGE UP (ref 34.5–45)
HCT VFR BLDA CALC: 38 % — SIGNIFICANT CHANGE UP (ref 34.5–46.5)
HGB BLD CALC-MCNC: 12.8 G/DL — SIGNIFICANT CHANGE UP (ref 11.5–15.5)
HGB BLD-MCNC: 11.5 G/DL — SIGNIFICANT CHANGE UP (ref 11.5–15.5)
HMPV RNA SPEC QL NAA+PROBE: SIGNIFICANT CHANGE UP
HPIV1 RNA SPEC QL NAA+PROBE: SIGNIFICANT CHANGE UP
HPIV2 RNA SPEC QL NAA+PROBE: SIGNIFICANT CHANGE UP
HPIV3 RNA SPEC QL NAA+PROBE: SIGNIFICANT CHANGE UP
HPIV4 RNA SPEC QL NAA+PROBE: SIGNIFICANT CHANGE UP
IANC: 4.99 K/UL — SIGNIFICANT CHANGE UP (ref 1.8–7.4)
IMM GRANULOCYTES NFR BLD AUTO: 0.3 % — SIGNIFICANT CHANGE UP (ref 0–0.9)
KETONES UR-MCNC: ABNORMAL
LACTATE BLDV-MCNC: 1.7 MMOL/L — SIGNIFICANT CHANGE UP (ref 0.5–2)
LEUKOCYTE ESTERASE UR-ACNC: NEGATIVE — SIGNIFICANT CHANGE UP
LYMPHOCYTES # BLD AUTO: 0.22 K/UL — LOW (ref 1–3.3)
LYMPHOCYTES # BLD AUTO: 3.8 % — LOW (ref 13–44)
M PNEUMO DNA SPEC QL NAA+PROBE: SIGNIFICANT CHANGE UP
MCHC RBC-ENTMCNC: 25.6 PG — LOW (ref 27–34)
MCHC RBC-ENTMCNC: 32.5 GM/DL — SIGNIFICANT CHANGE UP (ref 32–36)
MCV RBC AUTO: 78.7 FL — LOW (ref 80–100)
MONOCYTES # BLD AUTO: 0.54 K/UL — SIGNIFICANT CHANGE UP (ref 0–0.9)
MONOCYTES NFR BLD AUTO: 9.2 % — SIGNIFICANT CHANGE UP (ref 2–14)
NEUTROPHILS # BLD AUTO: 4.99 K/UL — SIGNIFICANT CHANGE UP (ref 1.8–7.4)
NEUTROPHILS NFR BLD AUTO: 85.2 % — HIGH (ref 43–77)
NITRITE UR-MCNC: NEGATIVE — SIGNIFICANT CHANGE UP
NRBC # BLD: 0 /100 WBCS — SIGNIFICANT CHANGE UP (ref 0–0)
NRBC # FLD: 0 K/UL — SIGNIFICANT CHANGE UP (ref 0–0)
PCO2 BLDV: 42 MMHG — SIGNIFICANT CHANGE UP (ref 39–42)
PH BLDV: 7.35 — SIGNIFICANT CHANGE UP (ref 7.32–7.43)
PH UR: 6.5 — SIGNIFICANT CHANGE UP (ref 5–8)
PLATELET # BLD AUTO: 297 K/UL — SIGNIFICANT CHANGE UP (ref 150–400)
PO2 BLDV: 24 MMHG — SIGNIFICANT CHANGE UP
POTASSIUM BLDV-SCNC: 3.2 MMOL/L — LOW (ref 3.5–5.1)
POTASSIUM SERPL-MCNC: 3.6 MMOL/L — SIGNIFICANT CHANGE UP (ref 3.5–5.3)
POTASSIUM SERPL-SCNC: 3.6 MMOL/L — SIGNIFICANT CHANGE UP (ref 3.5–5.3)
PROT SERPL-MCNC: 7.9 G/DL — SIGNIFICANT CHANGE UP (ref 6–8.3)
PROT UR-MCNC: ABNORMAL
RAPID RVP RESULT: DETECTED
RBC # BLD: 4.5 M/UL — SIGNIFICANT CHANGE UP (ref 3.8–5.2)
RBC # FLD: 14 % — SIGNIFICANT CHANGE UP (ref 10.3–14.5)
RBC CASTS # UR COMP ASSIST: SIGNIFICANT CHANGE UP /HPF (ref 0–4)
RSV RNA SPEC QL NAA+PROBE: SIGNIFICANT CHANGE UP
RV+EV RNA SPEC QL NAA+PROBE: SIGNIFICANT CHANGE UP
SAO2 % BLDV: 37.9 % — SIGNIFICANT CHANGE UP
SARS-COV-2 RNA SPEC QL NAA+PROBE: SIGNIFICANT CHANGE UP
SODIUM SERPL-SCNC: 136 MMOL/L — SIGNIFICANT CHANGE UP (ref 135–145)
SP GR SPEC: 1.03 — SIGNIFICANT CHANGE UP (ref 1.01–1.05)
UROBILINOGEN FLD QL: SIGNIFICANT CHANGE UP
WBC # BLD: 5.86 K/UL — SIGNIFICANT CHANGE UP (ref 3.8–10.5)
WBC # FLD AUTO: 5.86 K/UL — SIGNIFICANT CHANGE UP (ref 3.8–10.5)
WBC UR QL: SIGNIFICANT CHANGE UP /HPF (ref 0–5)

## 2022-11-23 PROCEDURE — 99285 EMERGENCY DEPT VISIT HI MDM: CPT

## 2022-11-23 PROCEDURE — 71045 X-RAY EXAM CHEST 1 VIEW: CPT | Mod: 26

## 2022-11-23 RX ORDER — METOCLOPRAMIDE HCL 10 MG
10 TABLET ORAL ONCE
Refills: 0 | Status: COMPLETED | OUTPATIENT
Start: 2022-11-23 | End: 2022-11-23

## 2022-11-23 RX ORDER — HYDROMORPHONE HYDROCHLORIDE 2 MG/ML
1 INJECTION INTRAMUSCULAR; INTRAVENOUS; SUBCUTANEOUS ONCE
Refills: 0 | Status: DISCONTINUED | OUTPATIENT
Start: 2022-11-23 | End: 2022-11-23

## 2022-11-23 RX ORDER — ACETAMINOPHEN 500 MG
1000 TABLET ORAL ONCE
Refills: 0 | Status: COMPLETED | OUTPATIENT
Start: 2022-11-23 | End: 2022-11-23

## 2022-11-23 RX ORDER — SODIUM CHLORIDE 9 MG/ML
2000 INJECTION INTRAMUSCULAR; INTRAVENOUS; SUBCUTANEOUS ONCE
Refills: 0 | Status: COMPLETED | OUTPATIENT
Start: 2022-11-23 | End: 2022-11-23

## 2022-11-23 RX ADMIN — Medication 400 MILLIGRAM(S): at 20:34

## 2022-11-23 RX ADMIN — SODIUM CHLORIDE 2000 MILLILITER(S): 9 INJECTION INTRAMUSCULAR; INTRAVENOUS; SUBCUTANEOUS at 20:34

## 2022-11-23 RX ADMIN — HYDROMORPHONE HYDROCHLORIDE 1 MILLIGRAM(S): 2 INJECTION INTRAMUSCULAR; INTRAVENOUS; SUBCUTANEOUS at 21:00

## 2022-11-23 RX ADMIN — Medication 10 MILLIGRAM(S): at 22:44

## 2022-11-23 NOTE — ED PROVIDER NOTE - OBJECTIVE STATEMENT
Patient is a 37-year-old female with no active medical issues.  She has a history of tubal ligation and cholecystectomy.  Patient was in usual state of health until yesterday.  This morning patient woke up with frontal and bilateral parietal headache that improved slightly with Excedrin but returned pretty quickly.  Patient then noted severe abdominal pain with back pain worsening throughout the day.  Patient then noted to have a fever upon triage.  Patient denies any sore throat, cough, chest pain, shortness of breath, or palpitations.  Patient does note nausea but no vomiting but denies any photophobia or phonophobia.  Patient denies any neck pain and has no urinary symptoms patient denies any sick contacts and travel.  Patient is vaccinated and boosted.  Patient notes no rashes.

## 2022-11-23 NOTE — ED PROVIDER NOTE - ABDOMINAL TENDER
Left more than right/left lower quadrant/right lower quadrant/left costovertebral angle/right costovertebral angle

## 2022-11-23 NOTE — ED ADULT TRIAGE NOTE - CHIEF COMPLAINT QUOTE
pt c/o headache/ back pain10/ 10 worsening over the day, feeling weakness/ nausea, pt febrile in triage.

## 2022-11-23 NOTE — ED PROVIDER NOTE - PHYSICAL EXAMINATION
pt writhing on bed and unable to sit still  pt also attempting to lay on floor due to back discomfort    there is no midline spinal tn and no areas of erythema or edema or mass

## 2022-11-23 NOTE — ED PROVIDER NOTE - PATIENT PORTAL LINK FT
You can access the FollowMyHealth Patient Portal offered by [facility] by registering at the following website: [email or web address]. By joining [employer]’s FollowMyHealth portal, you will also be able to view your health information using other applications (apps) compatible with our system.

## 2022-11-23 NOTE — ED PROVIDER NOTE - PROGRESS NOTE DETAILS
Pt notes symptomatic improvement, discussed lab and imaging findings with pt, pt in no respiratory distress, no urinary sxs, pt aware of abnormality to breast - states she follows with her ob regarding this. Informed pt of b/l small pleural effusion, pt to f/u with pmd however does not currently have one -- will have dc coordinator establish f/u

## 2022-11-23 NOTE — ED PROVIDER NOTE - CLINICAL SUMMARY MEDICAL DECISION MAKING FREE TEXT BOX
37-year-old female with 1 day of headache, fever, abdominal pain, nausea, back pain.  Patient also notes congestion.  Patient's vital signs meet criteria for sepsis.  Will check labs, cultures, VBG, chest x-ray, UA, blood cultures, and CT abdomen pelvis given areas of tenderness.  Patient's physical exam is not consistent with meningitis.  Will give symptomatic relief and reassess patient is requesting Dilaudid only as Toradol, Motrin, morphine, and Tylenol do not work.  We will give 1 dose now and reassess after.

## 2022-11-24 VITALS
HEART RATE: 74 BPM | OXYGEN SATURATION: 100 % | TEMPERATURE: 100 F | DIASTOLIC BLOOD PRESSURE: 69 MMHG | RESPIRATION RATE: 18 BRPM | SYSTOLIC BLOOD PRESSURE: 111 MMHG

## 2022-11-24 PROCEDURE — 74177 CT ABD & PELVIS W/CONTRAST: CPT | Mod: 26,MA

## 2022-11-24 RX ORDER — KETOROLAC TROMETHAMINE 30 MG/ML
15 SYRINGE (ML) INJECTION ONCE
Refills: 0 | Status: DISCONTINUED | OUTPATIENT
Start: 2022-11-24 | End: 2022-11-24

## 2022-11-24 RX ADMIN — Medication 15 MILLIGRAM(S): at 06:14

## 2022-11-25 LAB
CULTURE RESULTS: SIGNIFICANT CHANGE UP
SPECIMEN SOURCE: SIGNIFICANT CHANGE UP

## 2022-11-29 LAB
CULTURE RESULTS: SIGNIFICANT CHANGE UP
CULTURE RESULTS: SIGNIFICANT CHANGE UP
SPECIMEN SOURCE: SIGNIFICANT CHANGE UP
SPECIMEN SOURCE: SIGNIFICANT CHANGE UP

## 2022-12-05 ENCOUNTER — RESULT REVIEW (OUTPATIENT)
Age: 37
End: 2022-12-05

## 2023-01-20 ENCOUNTER — APPOINTMENT (OUTPATIENT)
Dept: MAMMOGRAPHY | Facility: CLINIC | Age: 38
End: 2023-01-20
Payer: COMMERCIAL

## 2023-01-20 ENCOUNTER — APPOINTMENT (OUTPATIENT)
Dept: ULTRASOUND IMAGING | Facility: CLINIC | Age: 38
End: 2023-01-20
Payer: COMMERCIAL

## 2023-01-20 ENCOUNTER — OUTPATIENT (OUTPATIENT)
Dept: OUTPATIENT SERVICES | Facility: HOSPITAL | Age: 38
LOS: 1 days | End: 2023-01-20
Payer: COMMERCIAL

## 2023-01-20 DIAGNOSIS — Z98.890 OTHER SPECIFIED POSTPROCEDURAL STATES: Chronic | ICD-10-CM

## 2023-01-20 DIAGNOSIS — Z98.51 TUBAL LIGATION STATUS: Chronic | ICD-10-CM

## 2023-01-20 DIAGNOSIS — Z90.49 ACQUIRED ABSENCE OF OTHER SPECIFIED PARTS OF DIGESTIVE TRACT: Chronic | ICD-10-CM

## 2023-01-20 DIAGNOSIS — Z98.891 HISTORY OF UTERINE SCAR FROM PREVIOUS SURGERY: Chronic | ICD-10-CM

## 2023-01-20 DIAGNOSIS — Z12.31 ENCOUNTER FOR SCREENING MAMMOGRAM FOR MALIGNANT NEOPLASM OF BREAST: ICD-10-CM

## 2023-01-20 DIAGNOSIS — Z33.2 ENCOUNTER FOR ELECTIVE TERMINATION OF PREGNANCY: Chronic | ICD-10-CM

## 2023-01-20 PROCEDURE — 77063 BREAST TOMOSYNTHESIS BI: CPT | Mod: 26

## 2023-01-20 PROCEDURE — 76641 ULTRASOUND BREAST COMPLETE: CPT

## 2023-01-20 PROCEDURE — 76641 ULTRASOUND BREAST COMPLETE: CPT | Mod: 26,50

## 2023-01-20 PROCEDURE — 76830 TRANSVAGINAL US NON-OB: CPT

## 2023-01-20 PROCEDURE — 76830 TRANSVAGINAL US NON-OB: CPT | Mod: 26

## 2023-01-20 PROCEDURE — 77067 SCR MAMMO BI INCL CAD: CPT | Mod: 26

## 2023-01-20 PROCEDURE — 77063 BREAST TOMOSYNTHESIS BI: CPT

## 2023-01-20 PROCEDURE — 77067 SCR MAMMO BI INCL CAD: CPT

## 2023-01-23 ENCOUNTER — TRANSCRIPTION ENCOUNTER (OUTPATIENT)
Age: 38
End: 2023-01-23

## 2023-02-02 ENCOUNTER — OUTPATIENT (OUTPATIENT)
Dept: OUTPATIENT SERVICES | Facility: HOSPITAL | Age: 38
LOS: 1 days | End: 2023-02-02
Payer: COMMERCIAL

## 2023-02-02 ENCOUNTER — APPOINTMENT (OUTPATIENT)
Dept: ULTRASOUND IMAGING | Facility: CLINIC | Age: 38
End: 2023-02-02
Payer: COMMERCIAL

## 2023-02-02 DIAGNOSIS — Z98.890 OTHER SPECIFIED POSTPROCEDURAL STATES: Chronic | ICD-10-CM

## 2023-02-02 DIAGNOSIS — Z33.2 ENCOUNTER FOR ELECTIVE TERMINATION OF PREGNANCY: Chronic | ICD-10-CM

## 2023-02-02 DIAGNOSIS — Z12.31 ENCOUNTER FOR SCREENING MAMMOGRAM FOR MALIGNANT NEOPLASM OF BREAST: ICD-10-CM

## 2023-02-02 DIAGNOSIS — Z98.51 TUBAL LIGATION STATUS: Chronic | ICD-10-CM

## 2023-02-02 DIAGNOSIS — Z98.891 HISTORY OF UTERINE SCAR FROM PREVIOUS SURGERY: Chronic | ICD-10-CM

## 2023-02-02 DIAGNOSIS — Z90.49 ACQUIRED ABSENCE OF OTHER SPECIFIED PARTS OF DIGESTIVE TRACT: Chronic | ICD-10-CM

## 2023-02-02 PROCEDURE — 76642 ULTRASOUND BREAST LIMITED: CPT

## 2023-02-02 PROCEDURE — 76642 ULTRASOUND BREAST LIMITED: CPT | Mod: 26,50

## 2023-05-18 ENCOUNTER — APPOINTMENT (OUTPATIENT)
Dept: PLASTIC SURGERY | Facility: CLINIC | Age: 38
End: 2023-05-18
Payer: COMMERCIAL

## 2023-05-18 VITALS
WEIGHT: 147 LBS | HEART RATE: 72 BPM | HEIGHT: 64 IN | TEMPERATURE: 98 F | DIASTOLIC BLOOD PRESSURE: 75 MMHG | OXYGEN SATURATION: 100 % | SYSTOLIC BLOOD PRESSURE: 111 MMHG | BODY MASS INDEX: 25.1 KG/M2

## 2023-05-18 PROCEDURE — 99203 OFFICE O/P NEW LOW 30 MIN: CPT

## 2023-05-18 NOTE — HISTORY OF PRESENT ILLNESS
[FreeTextEntry1] : Patient is a 37 year old female here today referred by NP  Halina Sandoval.\par There is no family history of breast or ovarian cancer.\par She has a history of bilateral breast reduction in 2021 (Dr. Luis Andrade)\par In 11/2022 she presented to the emergency room with fever, back pain.  She underwent a CT scan.\par 11/24/2022 CT chest/abdomen/pelvis: revelead a lobulated density right retroareolar breast.\par She saw her GYN NP Halina Sandoval for her annual visit 12/2022 and she recommended breast imaging.\par 1/20/2023 Bilateral mammogram: [facility]ena[facility]anderSugar Lifetime Risk: 12.7%\par The breasts are heterogeneously dense, which may obscure small masses. There are postreduction changes bilaterally. There is a lobulated mass in the slightly lower central anterior right breast (CC slice 8, MLO slice 38), possibly a hamartoma. There are scattered benign type calcifications including a rim calcified oil cyst in the right upper outer breast. No suspicious mass, suspicious microcalcifications, or other sign of malignancy is  identified in the left breast.\par Bilateral ultrasound: \par Right: The breast parenchyma demonstrates a heterogeneous background echotexture (mixed fatty and fibroglandular).\par 10:00, 4 cm from nipple is a hypoechoic mass measuring 4 x 5 x 7 mm. Further evaluation by diagnostic ultrasound.\par 10:00, 5 cm from nipple is a hypoechoic mass measuring 3 x 2 x 2 mm. Further evaluation by diagnostic ultrasound.\par 10:00, 5 cm from nipple is a hypoechoic mass measuring 3 x 2 x 3 mm. Further evaluation by diagnostic ultrasound.\par 12:00, 3 cm from nipple is a complicated cyst measuring 3 mm.\par Left: The breast parenchyma demonstrates a heterogeneous background echotexture (mixed fatty and fibroglandular).\par The left breast evaluation is technically limited as left upper inner quadrant is not documented.\par No suspicious solid mass in the visualized portions of the left breast. Additional imaging recommended. BI-RADS 0 \par 2/02/2023 Bilateral ultrasound: \par Right: 6:00 axis, retroareolar multiple dilated ducts spanning 3 cm with internal debris. This is thought to account for the mammographic finding.\par 10:00 axis, 5 cm from the nipple two 0.2 mm hypoechoic circumscribed nodules, possible oil cysts ,probably benign.\par 10:00 axis, 4 cm FN circumscribed hypoechoic mass measuring 0.3 x 0.5 x 0.7 cm, probably benign.\par Left: No suspicious solid mass upper inner quadrant. Ultrasound recommended in 6 months. BI-RADS 3\par She is here by herself. She reports a twinging sensation and pulling sensation of the right breast. She denies any nipple discharge.

## 2023-05-18 NOTE — CONSULT LETTER
[Courtesy Letter:] : I had the pleasure of seeing your patient, [unfilled], in my office today. [Please see my note below.] : Please see my note below. [Sincerely,] : Sincerely, [Dear  ___] : Dear ~MAHENDRA, [FreeTextEntry3] : Susan M. Palleschi, MD, FACS\par Division of Breast Surgery\par Director, Breast Surgery\par [facility]\par [address] [place] [address]\par [address]\[place] [address]\par (Phone) [phone]\par (Fax) [phone]

## 2023-05-18 NOTE — ASSESSMENT
[FreeTextEntry1] : Right breast dilated ducts on ultrasound corresponding to lobulated mass on mammogram.  I personally reviewed the images and agree with the benign appearance.  Her clinical breast examination is notable for a palpable mass of the right breast at the site of the dilated ducts.  In addition, the right ultrasound revealed  additional probably benign breast masses for which 6-month follow-up advised.\par \par 1. Advise next annual bilateral mammogram and breast ultrasound due 1/2024.\par 2.  Advised targeted right breast ultrasound follow up in 6 months, due 8/2023.\par 3. Follow up office visit due 11/2023.\par 4. Advised monthly self breast examinations and advised her to contact me if she has any concerns.

## 2023-05-18 NOTE — PHYSICAL EXAM
[Normocephalic] : normocephalic [Atraumatic] : atraumatic [EOMI] : extra ocular movement intact [Sclera nonicteric] : sclera nonicteric [Supple] : supple [No Supraclavicular Adenopathy] : no supraclavicular adenopathy [No Cervical Adenopathy] : no cervical adenopathy [No Thyromegaly] : no thyromegaly [Clear to Auscultation Bilat] : clear to auscultation bilaterally [Normal Sinus Rhythm] : normal sinus rhythm [Normal S1, S2] : normal S1 and S2 [No Gallops] : no gallops [No Rubs] : no pericardial rub [Examined in the supine and seated position] : examined in the supine and seated position [Bra Size: ___] : Bra Size: [unfilled] [No dominant masses] : no dominant masses left breast [No Nipple Retraction] : no left nipple retraction [No Nipple Discharge] : no left nipple discharge [No Axillary Lymphadenopathy] : no left axillary lymphadenopathy [No Edema] : no edema [No Rashes] : no rashes [No Ulceration] : no ulceration [de-identified] : Palpable mass right 7:00 breast, located 3 cm from the nipple, measuring 1.5 x 2.1 cm, smooth, lobulated consistent with dilated ducts documented on mammogram and breast ultrasound.

## 2023-09-18 NOTE — ED PROVIDER NOTE - NSCAREINITIATED _GEN_ER
To 40 from triage. Small, ~0.5cm punctate lac to left superior forehead. Bleeding controlled.   Changed to gown and up to be seen.    Sacha Mccall)

## 2023-09-19 ENCOUNTER — NON-APPOINTMENT (OUTPATIENT)
Age: 38
End: 2023-09-19

## 2023-11-08 NOTE — PROGRESS NOTE ADULT - PROBLEM SELECTOR PROBLEM 1
Contacted [facility] to let them know we an not contact pt.  delivery delivered

## 2023-11-29 ENCOUNTER — OUTPATIENT (OUTPATIENT)
Dept: OUTPATIENT SERVICES | Facility: HOSPITAL | Age: 38
LOS: 1 days | End: 2023-11-29
Payer: COMMERCIAL

## 2023-11-29 ENCOUNTER — RESULT REVIEW (OUTPATIENT)
Age: 38
End: 2023-11-29

## 2023-11-29 ENCOUNTER — NON-APPOINTMENT (OUTPATIENT)
Age: 38
End: 2023-11-29

## 2023-11-29 ENCOUNTER — APPOINTMENT (OUTPATIENT)
Dept: ULTRASOUND IMAGING | Facility: CLINIC | Age: 38
End: 2023-11-29
Payer: COMMERCIAL

## 2023-11-29 DIAGNOSIS — Z33.2 ENCOUNTER FOR ELECTIVE TERMINATION OF PREGNANCY: Chronic | ICD-10-CM

## 2023-11-29 DIAGNOSIS — Z98.51 TUBAL LIGATION STATUS: Chronic | ICD-10-CM

## 2023-11-29 DIAGNOSIS — R92.8 OTHER ABNORMAL AND INCONCLUSIVE FINDINGS ON DIAGNOSTIC IMAGING OF BREAST: ICD-10-CM

## 2023-11-29 DIAGNOSIS — Z98.890 OTHER SPECIFIED POSTPROCEDURAL STATES: Chronic | ICD-10-CM

## 2023-11-29 DIAGNOSIS — Z98.891 HISTORY OF UTERINE SCAR FROM PREVIOUS SURGERY: Chronic | ICD-10-CM

## 2023-11-29 DIAGNOSIS — Z90.49 ACQUIRED ABSENCE OF OTHER SPECIFIED PARTS OF DIGESTIVE TRACT: Chronic | ICD-10-CM

## 2023-11-29 PROCEDURE — 76642 ULTRASOUND BREAST LIMITED: CPT | Mod: 26,RT

## 2023-11-29 PROCEDURE — 76642 ULTRASOUND BREAST LIMITED: CPT

## 2023-12-01 ENCOUNTER — RESULT REVIEW (OUTPATIENT)
Age: 38
End: 2023-12-01

## 2023-12-01 ENCOUNTER — APPOINTMENT (OUTPATIENT)
Dept: ULTRASOUND IMAGING | Facility: CLINIC | Age: 38
End: 2023-12-01
Payer: COMMERCIAL

## 2023-12-01 ENCOUNTER — APPOINTMENT (OUTPATIENT)
Dept: PLASTIC SURGERY | Facility: CLINIC | Age: 38
End: 2023-12-01

## 2023-12-01 ENCOUNTER — OUTPATIENT (OUTPATIENT)
Dept: OUTPATIENT SERVICES | Facility: HOSPITAL | Age: 38
LOS: 1 days | End: 2023-12-01
Payer: COMMERCIAL

## 2023-12-01 DIAGNOSIS — Z00.8 ENCOUNTER FOR OTHER GENERAL EXAMINATION: ICD-10-CM

## 2023-12-01 DIAGNOSIS — Z98.51 TUBAL LIGATION STATUS: Chronic | ICD-10-CM

## 2023-12-01 DIAGNOSIS — R92.8 OTHER ABNORMAL AND INCONCLUSIVE FINDINGS ON DIAGNOSTIC IMAGING OF BREAST: ICD-10-CM

## 2023-12-01 DIAGNOSIS — Z98.891 HISTORY OF UTERINE SCAR FROM PREVIOUS SURGERY: Chronic | ICD-10-CM

## 2023-12-01 DIAGNOSIS — Z98.890 OTHER SPECIFIED POSTPROCEDURAL STATES: Chronic | ICD-10-CM

## 2023-12-01 DIAGNOSIS — Z33.2 ENCOUNTER FOR ELECTIVE TERMINATION OF PREGNANCY: Chronic | ICD-10-CM

## 2023-12-01 DIAGNOSIS — Z90.49 ACQUIRED ABSENCE OF OTHER SPECIFIED PARTS OF DIGESTIVE TRACT: Chronic | ICD-10-CM

## 2023-12-01 PROCEDURE — 19083 BX BREAST 1ST LESION US IMAG: CPT | Mod: RT

## 2023-12-01 PROCEDURE — 19083 BX BREAST 1ST LESION US IMAG: CPT

## 2023-12-01 PROCEDURE — A4648: CPT

## 2023-12-01 PROCEDURE — 77065 DX MAMMO INCL CAD UNI: CPT | Mod: 26,RT

## 2023-12-01 PROCEDURE — 77065 DX MAMMO INCL CAD UNI: CPT

## 2023-12-01 PROCEDURE — 88305 TISSUE EXAM BY PATHOLOGIST: CPT | Mod: 26

## 2023-12-01 PROCEDURE — 88305 TISSUE EXAM BY PATHOLOGIST: CPT

## 2023-12-06 ENCOUNTER — NON-APPOINTMENT (OUTPATIENT)
Age: 38
End: 2023-12-06

## 2023-12-21 NOTE — OB RN TRIAGE NOTE - PAIN RATING/NUMBER SCALE (0-10): ACTIVITY
Fluence: 26 Pre-Procedure: Prior to proceeding the treatment areas were cleaned and all present put on their eye protection. Treatment Number: 1 # Of Treatments In Package: 0 Price $ (Use Numbers Only, No Text Please.): 80 Laser Type: Cyanosure Icon IPL Consent: Written consent obtained, risks reviewed including but not limited to crusting, scabbing, blistering, scarring, darker or lighter pigmentary change, paradoxical hair regrowth, incomplete removal of hair and infection. Pulse Duration (In Ms): 20 Post-Care Instructions: I reviewed with the patient in detail post-care instructions. Patient should avoid sun for a minimum of 4 weeks before and after treatment.\\nrec 8week treatment Detail Level: Simple Skin Type: I Render Post-Care In The Note: Yes Location Override: side burns Filter/Handpiece: red Post-Procedure Care: Immediate endpoint: perifollicular erythema and edema. Vaseline and ice applied. Post care reviewed with patient. 5

## 2024-01-31 ENCOUNTER — APPOINTMENT (OUTPATIENT)
Dept: INTERNAL MEDICINE | Facility: CLINIC | Age: 39
End: 2024-01-31

## 2024-02-02 ENCOUNTER — OUTPATIENT (OUTPATIENT)
Dept: OUTPATIENT SERVICES | Facility: HOSPITAL | Age: 39
LOS: 1 days | End: 2024-02-02
Payer: COMMERCIAL

## 2024-02-02 ENCOUNTER — APPOINTMENT (OUTPATIENT)
Dept: MRI IMAGING | Facility: CLINIC | Age: 39
End: 2024-02-02
Payer: COMMERCIAL

## 2024-02-02 DIAGNOSIS — Z98.890 OTHER SPECIFIED POSTPROCEDURAL STATES: Chronic | ICD-10-CM

## 2024-02-02 DIAGNOSIS — Z33.2 ENCOUNTER FOR ELECTIVE TERMINATION OF PREGNANCY: Chronic | ICD-10-CM

## 2024-02-02 DIAGNOSIS — Z98.51 TUBAL LIGATION STATUS: Chronic | ICD-10-CM

## 2024-02-02 DIAGNOSIS — Z98.891 HISTORY OF UTERINE SCAR FROM PREVIOUS SURGERY: Chronic | ICD-10-CM

## 2024-02-02 DIAGNOSIS — D25.9 LEIOMYOMA OF UTERUS, UNSPECIFIED: ICD-10-CM

## 2024-02-02 DIAGNOSIS — Z90.49 ACQUIRED ABSENCE OF OTHER SPECIFIED PARTS OF DIGESTIVE TRACT: Chronic | ICD-10-CM

## 2024-02-02 PROCEDURE — A9585: CPT

## 2024-02-02 PROCEDURE — 72197 MRI PELVIS W/O & W/DYE: CPT

## 2024-02-02 PROCEDURE — 72197 MRI PELVIS W/O & W/DYE: CPT | Mod: 26

## 2024-02-08 ENCOUNTER — APPOINTMENT (OUTPATIENT)
Dept: INTERVENTIONAL RADIOLOGY/VASCULAR | Facility: CLINIC | Age: 39
End: 2024-02-08
Payer: COMMERCIAL

## 2024-02-08 VITALS — BODY MASS INDEX: 26.05 KG/M2 | HEIGHT: 63 IN | WEIGHT: 147 LBS

## 2024-02-08 PROCEDURE — 99204 OFFICE O/P NEW MOD 45 MIN: CPT

## 2024-02-08 RX ORDER — TIZANIDINE 2 MG/1
2 TABLET ORAL EVERY 6 HOURS
Qty: 24 | Refills: 0 | Status: COMPLETED | COMMUNITY
Start: 2020-10-21 | End: 2024-02-08

## 2024-02-08 RX ORDER — OXYBUTYNIN CHLORIDE 10 MG/1
10 TABLET, EXTENDED RELEASE ORAL
Qty: 30 | Refills: 11 | Status: COMPLETED | COMMUNITY
Start: 2020-10-23 | End: 2024-02-08

## 2024-02-08 RX ORDER — IMIPRAMINE HYDROCHLORIDE 10 MG/1
10 TABLET ORAL
Qty: 30 | Refills: 11 | Status: COMPLETED | COMMUNITY
Start: 2020-10-08 | End: 2024-02-08

## 2024-02-08 RX ORDER — NORETHINDRONE ACETATE AND ETHINYL ESTRADIOL AND FERROUS FUMARATE 1MG-20(21)
KIT ORAL
Refills: 0 | Status: ACTIVE | COMMUNITY

## 2024-02-08 RX ORDER — OMEPRAZOLE 10 MG/1
10 CAPSULE, DELAYED RELEASE ORAL
Refills: 0 | Status: ACTIVE | COMMUNITY

## 2024-02-08 NOTE — DATA REVIEWED
[FreeTextEntry1] : MR reviewed with patient at length. All questions answered during consultation.  Post  multifibroid uterus with masses in all layers and infiltrative fibroids diffusely.  No significant adenomyosis, endocavitary lesions or pedunculated lesions.

## 2024-02-08 NOTE — REVIEW OF SYSTEMS
[Fever] : no fever [Chills] : no chills [Feeling Tired] : not feeling tired [Nosebleeds] : no nosebleeds [Sore Throat] : no sore throat [Chest Pain] : no chest pain [Palpitations] : no palpitations [Shortness Of Breath] : no shortness of breath [Wheezing] : no wheezing [Cough] : no cough [Abdominal Pain] : no abdominal pain [Vomiting] : no vomiting [Easy Bleeding] : no tendency for easy bleeding [Easy Bruising] : no tendency for easy bruising

## 2024-02-08 NOTE — CONSULT LETTER
[Dear  ___] : Dear  [unfilled], [Consult Letter:] : I had the pleasure of evaluating your patient, [unfilled]. [Please see my note below.] : Please see my note below. [Consult Closing:] : Thank you very much for allowing me to participate in the care of this patient.  If you have any questions, please do not hesitate to contact me. [Sincerely,] : Sincerely, [FreeTextEntry3] : Jeyson Falcon MD, FSIR, FACR Interventional Radiologist   Executive  Department of Radiology- [facility]    Associate Professor of Radiology [employer] [facility] [employer] [facility]

## 2024-02-08 NOTE — HISTORY OF PRESENT ILLNESS
[Menorrhagia] : ~T menorrhagia [Urinary Frequency] : urinary frequency [Oral Contraceptives] : oral contraceptives [Last Menstrual Period (___)] : Last menstrual period [unfilled] [Monthly Cycles Regular] : monthly cycles are regular [G ___] : G [unfilled] [P___] : P [unfilled] [M___] : M [unfilled] [A___] : A [unfilled] [Plans for future pregnancies within 2 years] : does not plan to have future pregnancies within 2 years [FreeTextEntry1] : back pain, constipation

## 2024-02-08 NOTE — ASSESSMENT
[FreeTextEntry1] : Risks benefits and alternatives of the procedure discussed with patient. Technical aspects of procedure reviewed. Expected variable recovery described.Reviewed MRI study with patient. Patient wishes to proceed with embolization.

## 2024-02-08 NOTE — REASON FOR VISIT
[Consultation] : a consultation visit [Other Location: e.g. School (Enter Location, City,State)___] : at [unfilled], at the time of the visit. [FreeTextEntry1] : [place]

## 2024-02-23 ENCOUNTER — OUTPATIENT (OUTPATIENT)
Dept: OUTPATIENT SERVICES | Facility: HOSPITAL | Age: 39
LOS: 1 days | End: 2024-02-23

## 2024-02-23 VITALS
RESPIRATION RATE: 18 BRPM | OXYGEN SATURATION: 99 % | HEART RATE: 74 BPM | HEIGHT: 62.5 IN | WEIGHT: 143.96 LBS | DIASTOLIC BLOOD PRESSURE: 81 MMHG | SYSTOLIC BLOOD PRESSURE: 123 MMHG | TEMPERATURE: 98 F

## 2024-02-23 DIAGNOSIS — D25.9 LEIOMYOMA OF UTERUS, UNSPECIFIED: ICD-10-CM

## 2024-02-23 DIAGNOSIS — Z98.891 HISTORY OF UTERINE SCAR FROM PREVIOUS SURGERY: Chronic | ICD-10-CM

## 2024-02-23 DIAGNOSIS — Z33.2 ENCOUNTER FOR ELECTIVE TERMINATION OF PREGNANCY: Chronic | ICD-10-CM

## 2024-02-23 DIAGNOSIS — Z98.890 OTHER SPECIFIED POSTPROCEDURAL STATES: Chronic | ICD-10-CM

## 2024-02-23 DIAGNOSIS — Z98.51 TUBAL LIGATION STATUS: Chronic | ICD-10-CM

## 2024-02-23 DIAGNOSIS — Z90.49 ACQUIRED ABSENCE OF OTHER SPECIFIED PARTS OF DIGESTIVE TRACT: Chronic | ICD-10-CM

## 2024-02-23 DIAGNOSIS — D21.9 BENIGN NEOPLASM OF CONNECTIVE AND OTHER SOFT TISSUE, UNSPECIFIED: ICD-10-CM

## 2024-02-23 LAB
ANION GAP SERPL CALC-SCNC: 10 MMOL/L — SIGNIFICANT CHANGE UP (ref 7–14)
BUN SERPL-MCNC: 8 MG/DL — SIGNIFICANT CHANGE UP (ref 7–23)
CALCIUM SERPL-MCNC: 8.8 MG/DL — SIGNIFICANT CHANGE UP (ref 8.4–10.5)
CHLORIDE SERPL-SCNC: 105 MMOL/L — SIGNIFICANT CHANGE UP (ref 98–107)
CO2 SERPL-SCNC: 26 MMOL/L — SIGNIFICANT CHANGE UP (ref 22–31)
CREAT SERPL-MCNC: 0.75 MG/DL — SIGNIFICANT CHANGE UP (ref 0.5–1.3)
EGFR: 106 ML/MIN/1.73M2 — SIGNIFICANT CHANGE UP
GLUCOSE SERPL-MCNC: 92 MG/DL — SIGNIFICANT CHANGE UP (ref 70–99)
HCT VFR BLD CALC: 35.9 % — SIGNIFICANT CHANGE UP (ref 34.5–45)
HGB BLD-MCNC: 11.3 G/DL — LOW (ref 11.5–15.5)
MCHC RBC-ENTMCNC: 24.3 PG — LOW (ref 27–34)
MCHC RBC-ENTMCNC: 31.5 GM/DL — LOW (ref 32–36)
MCV RBC AUTO: 77.2 FL — LOW (ref 80–100)
NRBC # BLD: 0 /100 WBCS — SIGNIFICANT CHANGE UP (ref 0–0)
NRBC # FLD: 0 K/UL — SIGNIFICANT CHANGE UP (ref 0–0)
PLATELET # BLD AUTO: 299 K/UL — SIGNIFICANT CHANGE UP (ref 150–400)
POTASSIUM SERPL-MCNC: 2.9 MMOL/L — CRITICAL LOW (ref 3.5–5.3)
POTASSIUM SERPL-SCNC: 2.9 MMOL/L — CRITICAL LOW (ref 3.5–5.3)
RBC # BLD: 4.65 M/UL — SIGNIFICANT CHANGE UP (ref 3.8–5.2)
RBC # FLD: 13.7 % — SIGNIFICANT CHANGE UP (ref 10.3–14.5)
SODIUM SERPL-SCNC: 141 MMOL/L — SIGNIFICANT CHANGE UP (ref 135–145)
WBC # BLD: 5.48 K/UL — SIGNIFICANT CHANGE UP (ref 3.8–10.5)
WBC # FLD AUTO: 5.48 K/UL — SIGNIFICANT CHANGE UP (ref 3.8–10.5)

## 2024-02-23 RX ORDER — HYDROXYPROGESTERONE CAPROATE 250 MG/ML
0 VIAL (ML) INTRAMUSCULAR
Qty: 0 | Refills: 0 | DISCHARGE

## 2024-02-23 NOTE — H&P PST ADULT - PROBLEM SELECTOR PLAN 1
scheduled for uterine artery embolization  Written & verbal preop instructions, gi prophylaxis & surgical soap given  Pt verbalized good understanding.  Teach back done on surgical soap instructions.

## 2024-02-23 NOTE — H&P PST ADULT - ASSESSMENT
Goal Outcome Evaluation:      Plan of Care Reviewed With: patient, spouse    Overall Patient Progress: improvingOverall Patient Progress: improving     Vital signs are stable, fundus firm, scant flow. Using IP and Tucks. Voiding without difficulty. Saline lock removed.  Taking Tylenol and Ibuprofen for pain. Breastfeeding her baby girl with encouragement and assist to position and get a deep latch.  RN offered to give infant bath in early afternoon and in the evening.  Patient will call when they are ready for the bath.  Questions answered.  Continue current plan of care.   Preop dx fibroids

## 2024-02-23 NOTE — H&P PST ADULT - HISTORY OF PRESENT ILLNESS
39y/o female presents for preop eval for scheduled uterine artery embolization.  Pt with c/o excessive menstruation & uterine fibroids.

## 2024-02-26 ENCOUNTER — NON-APPOINTMENT (OUTPATIENT)
Age: 39
End: 2024-02-26

## 2024-02-27 ENCOUNTER — NON-APPOINTMENT (OUTPATIENT)
Age: 39
End: 2024-02-27

## 2024-02-27 DIAGNOSIS — E87.6 HYPOKALEMIA: ICD-10-CM

## 2024-02-27 RX ORDER — POTASSIUM CHLORIDE 1500 MG/1
20 TABLET, FILM COATED, EXTENDED RELEASE ORAL
Qty: 4 | Refills: 0 | Status: ACTIVE | COMMUNITY
Start: 2024-02-27 | End: 1900-01-01

## 2024-02-28 ENCOUNTER — RESULT REVIEW (OUTPATIENT)
Age: 39
End: 2024-02-28

## 2024-02-28 ENCOUNTER — OUTPATIENT (OUTPATIENT)
Dept: OUTPATIENT SERVICES | Facility: HOSPITAL | Age: 39
LOS: 1 days | Discharge: HOME | End: 2024-02-28
Payer: COMMERCIAL

## 2024-02-28 ENCOUNTER — APPOINTMENT (OUTPATIENT)
Dept: INTERNAL MEDICINE | Facility: CLINIC | Age: 39
End: 2024-02-28

## 2024-02-28 VITALS
TEMPERATURE: 98 F | SYSTOLIC BLOOD PRESSURE: 115 MMHG | OXYGEN SATURATION: 97 % | HEART RATE: 55 BPM | DIASTOLIC BLOOD PRESSURE: 76 MMHG

## 2024-02-28 DIAGNOSIS — D25.9 LEIOMYOMA OF UTERUS, UNSPECIFIED: ICD-10-CM

## 2024-02-28 DIAGNOSIS — Z98.890 OTHER SPECIFIED POSTPROCEDURAL STATES: Chronic | ICD-10-CM

## 2024-02-28 DIAGNOSIS — Z90.49 ACQUIRED ABSENCE OF OTHER SPECIFIED PARTS OF DIGESTIVE TRACT: Chronic | ICD-10-CM

## 2024-02-28 DIAGNOSIS — Z33.2 ENCOUNTER FOR ELECTIVE TERMINATION OF PREGNANCY: Chronic | ICD-10-CM

## 2024-02-28 DIAGNOSIS — Z98.51 TUBAL LIGATION STATUS: Chronic | ICD-10-CM

## 2024-02-28 DIAGNOSIS — Z98.891 HISTORY OF UTERINE SCAR FROM PREVIOUS SURGERY: Chronic | ICD-10-CM

## 2024-02-28 DIAGNOSIS — Z29.9 ENCOUNTER FOR PROPHYLACTIC MEASURES, UNSPECIFIED: ICD-10-CM

## 2024-02-28 LAB
ANION GAP SERPL CALC-SCNC: 10 MMOL/L — SIGNIFICANT CHANGE UP (ref 7–14)
BUN SERPL-MCNC: 9 MG/DL — SIGNIFICANT CHANGE UP (ref 7–23)
CALCIUM SERPL-MCNC: 8.7 MG/DL — SIGNIFICANT CHANGE UP (ref 8.4–10.5)
CHLORIDE SERPL-SCNC: 108 MMOL/L — HIGH (ref 98–107)
CO2 SERPL-SCNC: 21 MMOL/L — LOW (ref 22–31)
CREAT SERPL-MCNC: 0.64 MG/DL — SIGNIFICANT CHANGE UP (ref 0.5–1.3)
EGFR: 117 ML/MIN/1.73M2 — SIGNIFICANT CHANGE UP
GLUCOSE SERPL-MCNC: 75 MG/DL — SIGNIFICANT CHANGE UP (ref 70–99)
HCG UR QL: NEGATIVE — SIGNIFICANT CHANGE UP
POTASSIUM SERPL-MCNC: 4.4 MMOL/L — SIGNIFICANT CHANGE UP (ref 3.5–5.3)
POTASSIUM SERPL-SCNC: 4.4 MMOL/L — SIGNIFICANT CHANGE UP (ref 3.5–5.3)
SODIUM SERPL-SCNC: 139 MMOL/L — SIGNIFICANT CHANGE UP (ref 135–145)

## 2024-02-28 PROCEDURE — 37243 VASC EMBOLIZE/OCCLUDE ORGAN: CPT

## 2024-02-28 PROCEDURE — 76937 US GUIDE VASCULAR ACCESS: CPT | Mod: 26

## 2024-02-28 PROCEDURE — 36247 INS CATH ABD/L-EXT ART 3RD: CPT | Mod: 59

## 2024-02-28 PROCEDURE — 99222 1ST HOSP IP/OBS MODERATE 55: CPT

## 2024-02-28 RX ORDER — OMEPRAZOLE 10 MG/1
1 CAPSULE, DELAYED RELEASE ORAL
Refills: 0 | DISCHARGE

## 2024-02-28 RX ORDER — METOCLOPRAMIDE HCL 10 MG
10 TABLET ORAL EVERY 4 HOURS
Refills: 0 | Status: DISCONTINUED | OUTPATIENT
Start: 2024-02-28 | End: 2024-03-13

## 2024-02-28 RX ORDER — CIPROFLOXACIN LACTATE 400MG/40ML
500 VIAL (ML) INTRAVENOUS EVERY 12 HOURS
Refills: 0 | Status: DISCONTINUED | OUTPATIENT
Start: 2024-02-29 | End: 2024-03-13

## 2024-02-28 RX ORDER — HYDROMORPHONE HYDROCHLORIDE 2 MG/ML
30 INJECTION INTRAMUSCULAR; INTRAVENOUS; SUBCUTANEOUS
Refills: 0 | Status: DISCONTINUED | OUTPATIENT
Start: 2024-02-28 | End: 2024-02-29

## 2024-02-28 RX ORDER — SODIUM CHLORIDE 9 MG/ML
1000 INJECTION, SOLUTION INTRAVENOUS
Refills: 0 | Status: DISCONTINUED | OUTPATIENT
Start: 2024-02-28 | End: 2024-02-29

## 2024-02-28 RX ORDER — NALOXONE HYDROCHLORIDE 4 MG/.1ML
0.1 SPRAY NASAL
Refills: 0 | Status: DISCONTINUED | OUTPATIENT
Start: 2024-02-28 | End: 2024-03-13

## 2024-02-28 RX ORDER — ONDANSETRON 8 MG/1
4 TABLET, FILM COATED ORAL EVERY 6 HOURS
Refills: 0 | Status: DISCONTINUED | OUTPATIENT
Start: 2024-02-28 | End: 2024-03-13

## 2024-02-28 RX ORDER — NORETHINDRONE AND ETHINYL ESTRADIOL 0.4-0.035
1 KIT ORAL
Refills: 0 | DISCHARGE

## 2024-02-28 RX ORDER — KETOROLAC TROMETHAMINE 30 MG/ML
15 SYRINGE (ML) INJECTION EVERY 6 HOURS
Refills: 0 | Status: DISCONTINUED | OUTPATIENT
Start: 2024-02-28 | End: 2024-02-29

## 2024-02-28 RX ORDER — CIPROFLOXACIN LACTATE 400MG/40ML
400 VIAL (ML) INTRAVENOUS EVERY 12 HOURS
Refills: 0 | Status: COMPLETED | OUTPATIENT
Start: 2024-02-28 | End: 2024-02-28

## 2024-02-28 RX ADMIN — ONDANSETRON 4 MILLIGRAM(S): 8 TABLET, FILM COATED ORAL at 17:16

## 2024-02-28 RX ADMIN — HYDROMORPHONE HYDROCHLORIDE 30 MILLILITER(S): 2 INJECTION INTRAMUSCULAR; INTRAVENOUS; SUBCUTANEOUS at 19:26

## 2024-02-28 RX ADMIN — Medication 200 MILLIGRAM(S): at 17:28

## 2024-02-28 RX ADMIN — HYDROMORPHONE HYDROCHLORIDE 30 MILLILITER(S): 2 INJECTION INTRAMUSCULAR; INTRAVENOUS; SUBCUTANEOUS at 15:12

## 2024-02-28 RX ADMIN — SODIUM CHLORIDE 40 MILLILITER(S): 9 INJECTION, SOLUTION INTRAVENOUS at 12:42

## 2024-02-28 RX ADMIN — Medication 15 MILLIGRAM(S): at 18:28

## 2024-02-28 RX ADMIN — Medication 15 MILLIGRAM(S): at 17:28

## 2024-02-28 RX ADMIN — HYDROMORPHONE HYDROCHLORIDE 30 MILLILITER(S): 2 INJECTION INTRAMUSCULAR; INTRAVENOUS; SUBCUTANEOUS at 12:38

## 2024-02-28 NOTE — PATIENT PROFILE ADULT - FALL HARM RISK - HARM RISK INTERVENTIONS

## 2024-02-28 NOTE — H&P ADULT - PROBLEM SELECTOR PLAN 1
s/p uterine artery embolization (2/28)  - pain control per anesthesia: Dilaudid PCA pump followed by PRN oxycodone  - periprocedural abx per IR -   - advance diet as tolerated s/p uterine artery embolization (2/28)  - pain control per anesthesia: Dilaudid PCA pump followed by PRN percocet   - periprocedural abx per IR - ciprofloxacin on discharge   - advance diet as tolerated

## 2024-02-28 NOTE — H&P ADULT - NSHPLABSRESULTS_GEN_ALL_CORE
02-28    139  |  108<H>  |  9   ----------------------------<  75  4.4   |  21<L>  |  0.64    Ca    8.7      28 Feb 2024 10:05
no

## 2024-02-28 NOTE — H&P ADULT - NSHPPHYSICALEXAM_GEN_ALL_CORE
CONSTITUTIONAL: resting in bed comfortably   EYES: no conjunctival or scleral injection, non-icteric, PERRLA  ENMT: no external nasal lesions, oral mucosa with moist membranes  NECK: trachea midline, no palpable neck mass   RESPIRATORY: breathing comfortably, lungs CTA without wheeze/rales/rhonchi  CARDIOVASCULAR: regular rate and rhythm; +S1S2, no murmurs, rubs, or gallops, no lower extremity edema, 2+ peripheral pulses  GASTROINTESTINAL: soft, nontender, nondistended; +BS throughout, no rebound/guarding  MUSCULOSKELETAL: no joint effusions, normal strength and tone of extremities   NEUROLOGIC: non-focal, sensation intact to light touch in b/l upper and lower extremities   PSYCHIATRIC: AAOx3, appropriate mood and affect  SKIN: no rashes or lesions, warm

## 2024-02-28 NOTE — PRE PROCEDURE NOTE - PRE PROCEDURE EVALUATION
Interventional Radiology Pre-Procedure Note    This is a 38y Female with symptomatic uterine fibroids presenting for uterine embolization.  PAST MEDICAL & SURGICAL HISTORY:  Hypertrophy of breast  Uterine fibroid  Excessive menstruation  History of cholecystectomy    S/P D&C (status post dilation and curettage)  2/2 missed AB in   Termination of pregnancy (fetus)    H/O  section  x 2 (  and )  History of myomectomy  S/P tubal ligation   with last     Allergies:   Augmentin (Anaphylaxis; Urticaria)    Informed consent obtained. All questions and concerns have been addressed at this time.

## 2024-02-28 NOTE — PROCEDURE NOTE - PROCEDURE FINDINGS AND DETAILS
Bilateral uterine artery embolization performed using 500um and 700um particles. Hemostasis at right common femoral arteriotomy achieved using StarClose device.

## 2024-02-28 NOTE — PATIENT PROFILE ADULT - FUNCTIONAL ASSESSMENT - BASIC MOBILITY 5.
AMI Week 1 Survey      Responses   Facility patient discharged from?  [facility]   Does the patient have one of the following disease processes/diagnoses(primary or secondary)?  Acute MI (STEMI,NSTEMI)   Is there a successful TCM telephone encounter documented?  No   Week 1 attempt successful?  Yes   Call start time  1357   Rescheduled  Rescheduled-pt requested   Discharge diagnosis  NSTEMI, heart cath          India Mccallum RN  
4 = No assist / stand by assistance

## 2024-02-28 NOTE — H&P ADULT - PROBLEM SELECTOR PLAN 2
DVT ppx: lovenox  DIET: Regular   DISPO: Home DVT ppx: IMPROVE 0, no pharm ppx indicated   DIET: Regular   DISPO: Home

## 2024-02-28 NOTE — H&P ADULT - HISTORY OF PRESENT ILLNESS
38F with hx of uterine fibroids admitted for scheduled uterine artery embolization. Patient is seen post procedure.  38F with hx of uterine fibroids admitted for scheduled uterine artery embolization. Patient is seen post procedure, reports pain, no nausea. Trying to have a BM but refusing to use bedpan. Denies fevers, chills, cp, sob, n/v/d, dysuria, sick contacts, recent travel.

## 2024-02-29 ENCOUNTER — TRANSCRIPTION ENCOUNTER (OUTPATIENT)
Age: 39
End: 2024-02-29

## 2024-02-29 VITALS
HEART RATE: 59 BPM | SYSTOLIC BLOOD PRESSURE: 105 MMHG | DIASTOLIC BLOOD PRESSURE: 76 MMHG | OXYGEN SATURATION: 100 % | RESPIRATION RATE: 16 BRPM | TEMPERATURE: 99 F

## 2024-02-29 PROBLEM — D25.9 LEIOMYOMA OF UTERUS, UNSPECIFIED: Chronic | Status: ACTIVE | Noted: 2024-02-23

## 2024-02-29 LAB
ANION GAP SERPL CALC-SCNC: 11 MMOL/L — SIGNIFICANT CHANGE UP (ref 7–14)
BUN SERPL-MCNC: 7 MG/DL — SIGNIFICANT CHANGE UP (ref 7–23)
CALCIUM SERPL-MCNC: 8.7 MG/DL — SIGNIFICANT CHANGE UP (ref 8.4–10.5)
CHLORIDE SERPL-SCNC: 103 MMOL/L — SIGNIFICANT CHANGE UP (ref 98–107)
CO2 SERPL-SCNC: 21 MMOL/L — LOW (ref 22–31)
CREAT SERPL-MCNC: 0.59 MG/DL — SIGNIFICANT CHANGE UP (ref 0.5–1.3)
EGFR: 120 ML/MIN/1.73M2 — SIGNIFICANT CHANGE UP
GLUCOSE SERPL-MCNC: 81 MG/DL — SIGNIFICANT CHANGE UP (ref 70–99)
HCT VFR BLD CALC: 35.8 % — SIGNIFICANT CHANGE UP (ref 34.5–45)
HGB BLD-MCNC: 11.6 G/DL — SIGNIFICANT CHANGE UP (ref 11.5–15.5)
MAGNESIUM SERPL-MCNC: 1.8 MG/DL — SIGNIFICANT CHANGE UP (ref 1.6–2.6)
MCHC RBC-ENTMCNC: 24.7 PG — LOW (ref 27–34)
MCHC RBC-ENTMCNC: 32.4 GM/DL — SIGNIFICANT CHANGE UP (ref 32–36)
MCV RBC AUTO: 76.2 FL — LOW (ref 80–100)
NRBC # BLD: 0 /100 WBCS — SIGNIFICANT CHANGE UP (ref 0–0)
NRBC # FLD: 0 K/UL — SIGNIFICANT CHANGE UP (ref 0–0)
PHOSPHATE SERPL-MCNC: 3.3 MG/DL — SIGNIFICANT CHANGE UP (ref 2.5–4.5)
PLATELET # BLD AUTO: 301 K/UL — SIGNIFICANT CHANGE UP (ref 150–400)
POTASSIUM SERPL-MCNC: 3.6 MMOL/L — SIGNIFICANT CHANGE UP (ref 3.5–5.3)
POTASSIUM SERPL-SCNC: 3.6 MMOL/L — SIGNIFICANT CHANGE UP (ref 3.5–5.3)
RBC # BLD: 4.7 M/UL — SIGNIFICANT CHANGE UP (ref 3.8–5.2)
RBC # FLD: 13.9 % — SIGNIFICANT CHANGE UP (ref 10.3–14.5)
SODIUM SERPL-SCNC: 135 MMOL/L — SIGNIFICANT CHANGE UP (ref 135–145)
WBC # BLD: 8.55 K/UL — SIGNIFICANT CHANGE UP (ref 3.8–10.5)
WBC # FLD AUTO: 8.55 K/UL — SIGNIFICANT CHANGE UP (ref 3.8–10.5)

## 2024-02-29 PROCEDURE — 99239 HOSP IP/OBS DSCHRG MGMT >30: CPT

## 2024-02-29 RX ORDER — OXYCODONE AND ACETAMINOPHEN 5; 325 MG/1; MG/1
1 TABLET ORAL
Qty: 12 | Refills: 0
Start: 2024-02-29 | End: 2024-03-02

## 2024-02-29 RX ORDER — HYDROMORPHONE HYDROCHLORIDE 2 MG/ML
2 INJECTION INTRAMUSCULAR; INTRAVENOUS; SUBCUTANEOUS
Refills: 0 | Status: DISCONTINUED | OUTPATIENT
Start: 2024-02-29 | End: 2024-02-29

## 2024-02-29 RX ORDER — HYDROMORPHONE HYDROCHLORIDE 2 MG/ML
4 INJECTION INTRAMUSCULAR; INTRAVENOUS; SUBCUTANEOUS
Refills: 0 | Status: DISCONTINUED | OUTPATIENT
Start: 2024-02-29 | End: 2024-02-29

## 2024-02-29 RX ORDER — ACETAMINOPHEN 500 MG
650 TABLET ORAL EVERY 6 HOURS
Refills: 0 | Status: DISCONTINUED | OUTPATIENT
Start: 2024-02-29 | End: 2024-03-13

## 2024-02-29 RX ORDER — CIPROFLOXACIN LACTATE 400MG/40ML
1 VIAL (ML) INTRAVENOUS
Qty: 20 | Refills: 0
Start: 2024-02-29 | End: 2024-03-09

## 2024-02-29 RX ORDER — POTASSIUM CHLORIDE 20 MEQ
1 PACKET (EA) ORAL
Refills: 0 | DISCHARGE

## 2024-02-29 RX ORDER — IBUPROFEN 200 MG
1 TABLET ORAL
Qty: 16 | Refills: 0
Start: 2024-02-29 | End: 2024-03-03

## 2024-02-29 RX ADMIN — Medication 15 MILLIGRAM(S): at 01:00

## 2024-02-29 RX ADMIN — Medication 15 MILLIGRAM(S): at 00:01

## 2024-02-29 RX ADMIN — Medication 650 MILLIGRAM(S): at 11:10

## 2024-02-29 RX ADMIN — Medication 500 MILLIGRAM(S): at 06:23

## 2024-02-29 RX ADMIN — HYDROMORPHONE HYDROCHLORIDE 30 MILLILITER(S): 2 INJECTION INTRAMUSCULAR; INTRAVENOUS; SUBCUTANEOUS at 07:39

## 2024-02-29 NOTE — DISCHARGE NOTE PROVIDER - CARE PROVIDER_API CALL
Jeyson Falcon  Vascular/Intervent Radiology  [address] [place] [id number]  Phone: [phone]  Fax: [phone]  Established Patient  Follow Up Time:

## 2024-02-29 NOTE — PROGRESS NOTE ADULT - SUBJECTIVE AND OBJECTIVE BOX
Anesthesia Pain Management Service- Attending Addendum    SUBJECTIVE: Patient's pain control adequate    Therapy:	  [ X] IV PCA	   [ ] Epidural           [ ] s/p Spinal Opoid              [ ] Postpartum infusion	  [ ] Patient controlled regional anesthesia (PCRA)    [ ] prn Analgesics    Allergies    Augmentin (Anaphylaxis; Urticaria)    Intolerances      MEDICATIONS  (STANDING):  acetaminophen     Tablet .. 650 milliGRAM(s) Oral every 6 hours  ciprofloxacin     Tablet 500 milliGRAM(s) Oral every 12 hours    MEDICATIONS  (PRN):  HYDROmorphone   Tablet 4 milliGRAM(s) Oral every 3 hours PRN Severe Pain (7 - 10)  HYDROmorphone   Tablet 2 milliGRAM(s) Oral every 3 hours PRN Moderate Pain (4 - 6)  metoclopramide Injectable 10 milliGRAM(s) IV Push every 4 hours PRN Nausea and/or Vomiting  naloxone Injectable 0.1 milliGRAM(s) IV Push every 3 minutes PRN For ANY of the following changes in patient status:  A. RR LESS THAN 10 breaths per minute, B. Oxygen saturation LESS THAN 90%, C. Sedation score of 6  ondansetron Injectable 4 milliGRAM(s) IV Push every 6 hours PRN Nausea      OBJECTIVE:   [X] No new signs     [ ] Other:    Side Effects:  [X ] None			[ ] Other:      ASSESSMENT/PLAN  -Discontinue current therapy    [ ] Therapy changed to:    [ ] IV PCA       [ ] Epidural     [ X] prn Analgesics     Comments: Pain management per primary team, APS to sign off
ANESTHESIA POSTOP CHECK    38y Female POSTOP DAY 1 S/P     [ X] NO APPARENT ANESTHESIA COMPLICATIONS      Comments: 
Anesthesia Pain Management Service    SUBJECTIVE: Patient states she has severe pain last night. Reports taking Oxycodone in the past but does not help with pain per patient.  Pain Scale Score	At rest: 10/10___ 	With Activity: ___ 	[X ] Refer to charted pain scores    THERAPY:    [ ] IV PCA Morphine		[ ] 5 mg/mL	[ ] 1 mg/mL  [X ] IV PCA Hydromorphone	[ ] 5 mg/mL	[X ] 1 mg/mL  [ ] IV PCA Fentanyl		[ ] 50 micrograms/mL    Demand dose __0.2_ lockout __6_ (minutes) Continuous Rate _0__ Total: _4.3__   mg used (in past 24 hrs)      MEDICATIONS  (STANDING):  acetaminophen     Tablet .. 650 milliGRAM(s) Oral every 6 hours  ciprofloxacin     Tablet 500 milliGRAM(s) Oral every 12 hours    MEDICATIONS  (PRN):  HYDROmorphone   Tablet 4 milliGRAM(s) Oral every 3 hours PRN Severe Pain (7 - 10)  HYDROmorphone   Tablet 2 milliGRAM(s) Oral every 3 hours PRN Moderate Pain (4 - 6)  metoclopramide Injectable 10 milliGRAM(s) IV Push every 4 hours PRN Nausea and/or Vomiting  naloxone Injectable 0.1 milliGRAM(s) IV Push every 3 minutes PRN For ANY of the following changes in patient status:  A. RR LESS THAN 10 breaths per minute, B. Oxygen saturation LESS THAN 90%, C. Sedation score of 6  ondansetron Injectable 4 milliGRAM(s) IV Push every 6 hours PRN Nausea      OBJECTIVE: Patient laying in bed.    Sedation Score:	[ X] Alert	[ ] Drowsy 	[ ] Arousable	[ ] Asleep	[ ] Unresponsive    Side Effects:	[X ] None	[ ] Nausea	[ ] Vomiting	[ ] Pruritus  		[ ] Other:    Vital Signs Last 24 Hrs  T(C): 37.2 (29 Feb 2024 05:24), Max: 37.2 (29 Feb 2024 05:24)  T(F): 98.9 (29 Feb 2024 05:24), Max: 98.9 (29 Feb 2024 05:24)  HR: 106 (29 Feb 2024 05:24) (55 - 106)  BP: 124/84 (29 Feb 2024 05:24) (109/68 - 124/84)  BP(mean): --  RR: 16 (29 Feb 2024 05:24) (16 - 19)  SpO2: 100% (29 Feb 2024 05:24) (97% - 100%)    Parameters below as of 29 Feb 2024 05:24  Patient On (Oxygen Delivery Method): room air        ASSESSMENT/ PLAN    Therapy to  be:	[ ] Continue   [ X] Discontinued   [X ] Change to prn Analgesics    Documentation and Verification of current medications:   [X] Done	[ ] Not done, not elligible    Comments: IV PCA discontinued. PRN Oral/IV opioids and/or Adjuvant non-opioid medication to be ordered at this point.    Progress Note written now but Patient was seen earlier.
  38y Female s/p bilateral uterine artery embolization on 2/28 in Interventional Radiology.     Patient seen and examined bedside resting comfortably. Pain controlled with PCA pump.    T(F): 98.9 (02-29-24 @ 05:24), Max: 98.9 (02-29-24 @ 05:24)  HR: 106 (02-29-24 @ 05:24) (55 - 106)  BP: 124/84 (02-29-24 @ 05:24) (109/68 - 124/84)  RR: 16 (02-29-24 @ 05:24) (16 - 19)  SpO2: 100% (02-29-24 @ 05:24) (97% - 100%)  Wt(kg): --    LABS:                        11.6   8.55  )-----------( 301      ( 29 Feb 2024 06:15 )             35.8     02-29    135  |  103  |  7   ----------------------------<  81  3.6   |  21<L>  |  0.59    Ca    8.7      29 Feb 2024 06:15  Phos  3.3     02-29  Mg     1.80     02-29    PHYSICAL EXAM:  General: Nontoxic, in NAD  Right groin: dressing c/d/i, No hematoma, erythema, swelling or active bleeding noted. 
Patient is a 38y old  Female who presents with a chief complaint of UAE    SUBJECTIVE / OVERNIGHT EVENTS: pain was uncontrolled as PCA wasn't working overnight now resumed and pain controlled     ROS:  No HA/DZ  No Vision changes   No CP, SOB  No N/V/D  No Edema  No Rash  NO weakness, numbness    MEDICATIONS  (STANDING):  acetaminophen     Tablet .. 650 milliGRAM(s) Oral every 6 hours  ciprofloxacin     Tablet 500 milliGRAM(s) Oral every 12 hours  lactated ringers. 1000 milliLiter(s) (40 mL/Hr) IV Continuous <Continuous>    MEDICATIONS  (PRN):  HYDROmorphone   Tablet 2 milliGRAM(s) Oral every 3 hours PRN Moderate Pain (4 - 6)  HYDROmorphone   Tablet 4 milliGRAM(s) Oral every 3 hours PRN Severe Pain (7 - 10)  metoclopramide Injectable 10 milliGRAM(s) IV Push every 4 hours PRN Nausea and/or Vomiting  naloxone Injectable 0.1 milliGRAM(s) IV Push every 3 minutes PRN For ANY of the following changes in patient status:  A. RR LESS THAN 10 breaths per minute, B. Oxygen saturation LESS THAN 90%, C. Sedation score of 6  ondansetron Injectable 4 milliGRAM(s) IV Push every 6 hours PRN Nausea      T(C): 37.2 (02-29-24 @ 05:24)  HR: 106 (02-29-24 @ 05:24)  BP: 124/84 (02-29-24 @ 05:24)  RR: 16 (02-29-24 @ 05:24)  SpO2: 100% (02-29-24 @ 05:24)  CAPILLARY BLOOD GLUCOSE        I&O's Summary      PHYSICAL EXAM:  GENERAL: NAD, well-developed, AOx3  HEAD:  Atraumatic, Normocephalic  EYES: EOMI, PERRL, conjunctiva and sclera clear  NECK: Supple, No JVD  CHEST/LUNG: Clear to auscultation bilaterally  HEART: Regular rate and rhythm; No murmurs, rubs, or gallops, No Edema  ABDOMEN: Soft, Nontender, Nondistended; Bowel sounds present  EXTREMITIES:  2+ Peripheral Pulses, No clubbing, cyanosis  PSYCH: No SI/HI  NEUROLOGY: non-focal  SKIN: No rashes or lesions    LABS:                        11.6   8.55  )-----------( 301      ( 29 Feb 2024 06:15 )             35.8     02-29    135  |  103  |  7   ----------------------------<  81  3.6   |  21<L>  |  0.59    Ca    8.7      29 Feb 2024 06:15  Phos  3.3     02-29  Mg     1.80     02-29            Urinalysis Basic - ( 29 Feb 2024 06:15 )    Color: x / Appearance: x / SG: x / pH: x  Gluc: 81 mg/dL / Ketone: x  / Bili: x / Urobili: x   Blood: x / Protein: x / Nitrite: x   Leuk Esterase: x / RBC: x / WBC x   Sq Epi: x / Non Sq Epi: x / Bacteria: x            RADIOLOGY & ADDITIONAL TESTS:    Imaging Personally Reviewed:    Consultant(s) Notes Reviewed:      Care Discussed with Consultants/Other Providers:

## 2024-02-29 NOTE — DISCHARGE NOTE NURSING/CASE MANAGEMENT/SOCIAL WORK - PATIENT PORTAL LINK FT
You can access the FollowMyHealth Patient Portal offered by [facility] by registering at the following website: [email or web address]. By joining [facility]’s FollowMyHealth portal, you will also be able to view your health information using other applications (apps) compatible with our system.

## 2024-02-29 NOTE — DISCHARGE NOTE NURSING/CASE MANAGEMENT/SOCIAL WORK - NSDCPEFALRISK_GEN_ALL_CORE
For information on Fall & Injury Prevention, visit: [email or web address] OR  [email or web address] OR  https://www.cdc.gov/steadi/patient.html
negative...

## 2024-02-29 NOTE — DISCHARGE NOTE PROVIDER - NSDCFUSCHEDAPPT_GEN_ALL_CORE_FT
Samuel Rivers  [facility] PST-Presurgtest  Scheduled Appointment: 03/06/2024    Michel Cortes  [facility]  PLASTICSUR [address]  Scheduled Appointment: 03/07/2024    Samuel Rivers  [facility] MOR-Sameday  Scheduled Appointment: 03/20/2024    Jeyson Falcon  [facility]  INTERVEN [address]  Scheduled Appointment: 03/21/2024    Shikowitz-Behr, Lauren B  [facility]  PLASTICSUR [address]  Scheduled Appointment: 03/22/2024

## 2024-02-29 NOTE — DISCHARGE NOTE PROVIDER - NSDCCPCAREPLAN_GEN_ALL_CORE_FT
PRINCIPAL DISCHARGE DIAGNOSIS  Diagnosis: Uterine fibroid  Assessment and Plan of Treatment: you underwent UAE. please complete antibiotics as prescribed. take pain meds as needed and follow up with GYN

## 2024-02-29 NOTE — DISCHARGE NOTE PROVIDER - HOSPITAL COURSE
38F with hx of uterine fibroids admitted for scheduled uterine artery embolization (2/28).   Uterine fibroid.   ·  Plan: s/p uterine artery embolization (2/28)  - pain control per anesthesia: Dilaudid PCA pump followed by PRN percocet   - periprocedural abx per IR - ciprofloxacin on discharge x 10 days   - reg diet.

## 2024-02-29 NOTE — PROGRESS NOTE ADULT - PROBLEM SELECTOR PLAN 2
Called to schedule .  No answer NO vm. DVT ppx: IMPROVE 0, no pharm ppx indicated   DIET: Regular   DISPO: Home pending pain control

## 2024-02-29 NOTE — PROGRESS NOTE ADULT - PROBLEM SELECTOR PLAN 1
s/p uterine artery embolization (2/28)  - pain control per anesthesia: Dilaudid PCA pump followed by PRN percocet   - periprocedural abx per IR - ciprofloxacin on discharge - clarify w IR duration   - reg diet

## 2024-02-29 NOTE — DISCHARGE NOTE NURSING/CASE MANAGEMENT/SOCIAL WORK - NSDCVIVACCINE_GEN_ALL_CORE_FT
influenza, injectable, quadrivalent, preservative free; 03-Sep-2019 13:06; Madhuri Zapien (RN); Oncoscope; 3BS44 (Exp. Date: 30-Jun-2020); IntraMuscular; Deltoid Left.; 0.5 milliLiter(s); VIS (VIS Published: 07-Aug-2015, VIS Presented: 03-Sep-2019);

## 2024-02-29 NOTE — DISCHARGE NOTE PROVIDER - NSDCMRMEDTOKEN_GEN_ALL_CORE_FT
Lo Loestrin Fe oral tablet: 1 tab(s) orally once a day  omeprazole 10 mg oral delayed release capsule: 1 cap(s) orally  potassium chloride 20 mEq oral tablet, extended release: 1 tab(s) orally   Cipro 500 mg oral tablet: 1 tab(s) orally every 12 hours  ibuprofen 400 mg oral tablet: 1 tab(s) orally every 6 hours as needed for  moderate pain  Lo Loestrin Fe oral tablet: 1 tab(s) orally once a day  omeprazole 10 mg oral delayed release capsule: 1 cap(s) orally  Percocet 5 mg-325 mg oral tablet: 1 tab(s) orally every 6 hours as needed for pain MDD: 4 tabs

## 2024-02-29 NOTE — PROGRESS NOTE ADULT - ASSESSMENT
38y Female with symptomatic uterine fibroid s/p bilateral uterine artery embolization on 2/28 in Interventional Radiology.     Plan:  - Ok to discharge if patient tolerates PO diet and pain is controlled with PO pain meds.   - Patient scheduled for outpatient virtual appt with Dr. Falcon on 3/21/24 at 12pm.   - Cipro 500mg BID x 10 days  - Ibuprofen 400mg Q6h x 3-4 days  - Percocet 5/325 mg Q4-6h PRN    [id number]    
38F with hx of uterine fibroids admitted for scheduled uterine artery embolization (2/28).

## 2024-03-01 RX ORDER — OXYCODONE 5 MG/1
5 TABLET ORAL
Qty: 8 | Refills: 0 | Status: ACTIVE | COMMUNITY
Start: 2024-03-01 | End: 1900-01-01

## 2024-03-01 RX ORDER — HYDROMORPHONE HYDROCHLORIDE 2 MG/1
2 TABLET ORAL
Qty: 8 | Refills: 0 | Status: DISCONTINUED | COMMUNITY
Start: 2024-03-01 | End: 2024-03-01

## 2024-03-07 ENCOUNTER — APPOINTMENT (OUTPATIENT)
Dept: PLASTIC SURGERY | Facility: CLINIC | Age: 39
End: 2024-03-07

## 2024-03-07 VITALS
HEIGHT: 63 IN | DIASTOLIC BLOOD PRESSURE: 77 MMHG | SYSTOLIC BLOOD PRESSURE: 110 MMHG | BODY MASS INDEX: 25.73 KG/M2 | WEIGHT: 145.19 LBS | TEMPERATURE: 98 F | HEART RATE: 74 BPM | OXYGEN SATURATION: 100 %

## 2024-03-07 PROCEDURE — XXXXX: CPT | Mod: 1L

## 2024-03-07 NOTE — HISTORY OF PRESENT ILLNESS
[FreeTextEntry1] : Ms. Perez is a 38 year old female with s/p gastric bypass. He presents to discuss removal of her excess skin. on her abdomen.

## 2024-03-07 NOTE — ASSESSMENT
[FreeTextEntry1] : 38 years old female with s/p gastric bypass. She presents to discuss panniculectomy.  PLAN: Logistic of surgery and recovery discussed with patient. Surgical drain and restriction discussed with patient. Will request for insurance approval and will follow up with patient upon approval.

## 2024-03-11 DIAGNOSIS — D25.9 LEIOMYOMA OF UTERUS, UNSPECIFIED: ICD-10-CM

## 2024-03-21 ENCOUNTER — APPOINTMENT (OUTPATIENT)
Dept: INTERVENTIONAL RADIOLOGY/VASCULAR | Facility: CLINIC | Age: 39
End: 2024-03-21
Payer: COMMERCIAL

## 2024-03-21 ENCOUNTER — NON-APPOINTMENT (OUTPATIENT)
Age: 39
End: 2024-03-21

## 2024-03-21 VITALS — HEIGHT: 63 IN | WEIGHT: 145 LBS | BODY MASS INDEX: 25.69 KG/M2

## 2024-03-21 DIAGNOSIS — D25.9 LEIOMYOMA OF UTERUS, UNSPECIFIED: ICD-10-CM

## 2024-03-21 PROCEDURE — 99203 OFFICE O/P NEW LOW 30 MIN: CPT

## 2024-03-21 NOTE — PHYSICAL EXAM
[Good] : good [No Change] : no change [Stable] : stable [No] : no [A & O x 3] : alert and oriented to person, place, and time [No Hematoma/ Swelling] : no hematoma and or swelling [Right] : right [Unchanged from prior visit] : unchanged from prior visit [Fever/Chills] : no fever and or chills [Flu Like Symptoms] : no flu like symptoms [FreeTextEntry6] : s/p MARTÍN on 02/28/24 [Fever] : no fever [FreeTextEntry2] : 2/28/24 [FreeTextEntry1] : fibroids

## 2024-03-21 NOTE — HISTORY OF PRESENT ILLNESS
[FreeTextEntry1] : Patient is a 38 years old female with hx of symptomatic uterine fibroids. Symptoms have been persistent for a while but recently got worse. Symptoms include:  heavy bleeding, urinary frequency, constipation and pelvic pain.   Endometrial bx : Stated bx was from January 2024 by Dr. Santoro. Patient was given IR fax and email for Dr. Santoro to send the report.   Gyn: Dr. Zoila Santoro   Denies any recent SOB, CP, fever, chills, n/v/d.   INTERVAL HISTORY 03/21/24 Patient is now s/p UAE on 02/28/24. Patient states she had tremendous amount of pelvic pain post procedure for the first week to ten days. Patient states her pain is now relieved. Urinary frequency is still an issue at this time.   Denies any recent SOB, CP, fever, chills, n/v/d.  Patient endorses spotting and vaginal discharge post procedure and denies menstrual cycle since procedure.   **patient is aware to reach out to the IR office once her follow up MR is complete.

## 2024-03-21 NOTE — REASON FOR VISIT
[Medical Office: ([facility])___] : at the medical office located in  [Patient] : the patient [Self] : self [Other Location: e.g. School (Enter Location, City,State)___] : at [unfilled], at the time of the visit.

## 2024-03-21 NOTE — CONSULT LETTER
[FreeTextEntry3] : Jeyson Falcon MD, FSIR, FACR Interventional Radiologist   Executive  Department of Radiology- [facility]    Associate Professor of Radiology [employer] at [facility]

## 2024-03-21 NOTE — ASSESSMENT
[FreeTextEntry1] : Patient is doing well post procedure. We have arranged for her to undergo an MRI to document the extent of fibroid infarction.  We have instructed her to see her GYN in approximately 3-4 months postprocedure and explained that she will arrange for an ultrasound at that point.

## 2024-03-22 ENCOUNTER — APPOINTMENT (OUTPATIENT)
Dept: PLASTIC SURGERY | Facility: CLINIC | Age: 39
End: 2024-03-22

## 2024-04-04 PROBLEM — N92.0 EXCESSIVE AND FREQUENT MENSTRUATION WITH REGULAR CYCLE: Chronic | Status: ACTIVE | Noted: 2024-02-23

## 2024-04-25 NOTE — ED PROVIDER NOTE - MOUTH/THROAT [+], MLM
Hypotonic hyponatremia in the setting of poor p.o. intake, infection, diuretic use  Will hold home Lasix for now  Started on isolated by nephrology  Daily BMP   worsening (yesterday) left lower jaw pain/swelling and left lower dental pain

## 2024-05-04 ENCOUNTER — APPOINTMENT (OUTPATIENT)
Dept: MRI IMAGING | Facility: CLINIC | Age: 39
End: 2024-05-04
Payer: COMMERCIAL

## 2024-05-04 ENCOUNTER — OUTPATIENT (OUTPATIENT)
Dept: OUTPATIENT SERVICES | Facility: HOSPITAL | Age: 39
LOS: 1 days | End: 2024-05-04
Payer: COMMERCIAL

## 2024-05-04 DIAGNOSIS — Z98.890 OTHER SPECIFIED POSTPROCEDURAL STATES: Chronic | ICD-10-CM

## 2024-05-04 DIAGNOSIS — Z98.51 TUBAL LIGATION STATUS: Chronic | ICD-10-CM

## 2024-05-04 DIAGNOSIS — D25.9 LEIOMYOMA OF UTERUS, UNSPECIFIED: ICD-10-CM

## 2024-05-04 DIAGNOSIS — Z98.891 HISTORY OF UTERINE SCAR FROM PREVIOUS SURGERY: Chronic | ICD-10-CM

## 2024-05-04 DIAGNOSIS — Z90.49 ACQUIRED ABSENCE OF OTHER SPECIFIED PARTS OF DIGESTIVE TRACT: Chronic | ICD-10-CM

## 2024-05-04 DIAGNOSIS — Z33.2 ENCOUNTER FOR ELECTIVE TERMINATION OF PREGNANCY: Chronic | ICD-10-CM

## 2024-05-04 PROCEDURE — A9585: CPT

## 2024-05-04 PROCEDURE — 72197 MRI PELVIS W/O & W/DYE: CPT

## 2024-05-04 PROCEDURE — 72197 MRI PELVIS W/O & W/DYE: CPT | Mod: 26

## 2024-05-17 ENCOUNTER — APPOINTMENT (OUTPATIENT)
Dept: INTERNAL MEDICINE | Facility: CLINIC | Age: 39
End: 2024-05-17
Payer: COMMERCIAL

## 2024-05-17 VITALS
WEIGHT: 145 LBS | BODY MASS INDEX: 25.69 KG/M2 | HEIGHT: 63 IN | RESPIRATION RATE: 16 BRPM | DIASTOLIC BLOOD PRESSURE: 79 MMHG | SYSTOLIC BLOOD PRESSURE: 112 MMHG | TEMPERATURE: 98 F | OXYGEN SATURATION: 100 % | HEART RATE: 80 BPM

## 2024-05-17 DIAGNOSIS — R92.8 OTHER ABNORMAL AND INCONCLUSIVE FINDINGS ON DIAGNOSTIC IMAGING OF BREAST: ICD-10-CM

## 2024-05-17 DIAGNOSIS — G89.18 OTHER ACUTE POSTPROCEDURAL PAIN: ICD-10-CM

## 2024-05-17 DIAGNOSIS — M25.562 PAIN IN LEFT KNEE: ICD-10-CM

## 2024-05-17 DIAGNOSIS — Z87.898 PERSONAL HISTORY OF OTHER SPECIFIED CONDITIONS: ICD-10-CM

## 2024-05-17 DIAGNOSIS — Z00.00 ENCOUNTER FOR GENERAL ADULT MEDICAL EXAMINATION W/OUT ABNORMAL FINDINGS: ICD-10-CM

## 2024-05-17 DIAGNOSIS — Z78.9 OTHER SPECIFIED HEALTH STATUS: ICD-10-CM

## 2024-05-17 DIAGNOSIS — Z86.39 PERSONAL HISTORY OF OTHER ENDOCRINE, NUTRITIONAL AND METABOLIC DISEASE: ICD-10-CM

## 2024-05-17 DIAGNOSIS — M54.50 LOW BACK PAIN, UNSPECIFIED: ICD-10-CM

## 2024-05-17 DIAGNOSIS — O09.90 SUPERVISION OF HIGH RISK PREGNANCY, UNSPECIFIED, UNSPECIFIED TRIMESTER: ICD-10-CM

## 2024-05-17 PROCEDURE — 99385 PREV VISIT NEW AGE 18-39: CPT

## 2024-05-21 LAB
ALBUMIN SERPL ELPH-MCNC: 4.8 G/DL
ALP BLD-CCNC: 49 U/L
ALT SERPL-CCNC: 11 U/L
ANION GAP SERPL CALC-SCNC: 10 MMOL/L
AST SERPL-CCNC: 12 U/L
BILIRUB SERPL-MCNC: 0.5 MG/DL
BUN SERPL-MCNC: 11 MG/DL
CALCIUM SERPL-MCNC: 9.3 MG/DL
CHLORIDE SERPL-SCNC: 103 MMOL/L
CHOLEST SERPL-MCNC: 179 MG/DL
CO2 SERPL-SCNC: 24 MMOL/L
CREAT SERPL-MCNC: 0.85 MG/DL
EGFR: 91 ML/MIN/1.73M2
ESTIMATED AVERAGE GLUCOSE: 111 MG/DL
GLUCOSE SERPL-MCNC: 71 MG/DL
HBA1C MFR BLD HPLC: 5.5 %
HCT VFR BLD CALC: 38.3 %
HDLC SERPL-MCNC: 71 MG/DL
HGB BLD-MCNC: 12.2 G/DL
LDLC SERPL CALC-MCNC: 98 MG/DL
MCHC RBC-ENTMCNC: 24.1 PG
MCHC RBC-ENTMCNC: 31.9 GM/DL
MCV RBC AUTO: 75.7 FL
NONHDLC SERPL-MCNC: 108 MG/DL
PLATELET # BLD AUTO: 352 K/UL
POTASSIUM SERPL-SCNC: 4.1 MMOL/L
PROT SERPL-MCNC: 8.3 G/DL
RBC # BLD: 5.06 M/UL
RBC # FLD: 15.4 %
SODIUM SERPL-SCNC: 137 MMOL/L
TRIGL SERPL-MCNC: 53 MG/DL
TSH SERPL-ACNC: 1.41 UIU/ML
WBC # FLD AUTO: 4.24 K/UL

## 2024-06-26 ENCOUNTER — APPOINTMENT (OUTPATIENT)
Dept: INTERNAL MEDICINE | Facility: CLINIC | Age: 39
End: 2024-06-26

## 2024-07-15 LAB
AMPHET UR-MCNC: NEGATIVE NG/ML
BENZODIAZ UR-MCNC: NEGATIVE NG/ML
COCAINE METAB.OTHER UR-MCNC: NEGATIVE NG/ML
CREATININE, URINE: 103.4 MG/DL
FENTANYL, URINE: NEGATIVE NG/ML
HBV SURFACE AB SER QL: REACTIVE
M TB IFN-G BLD-IMP: NEGATIVE
METHADONE SCREEN, UR: NEGATIVE NG/ML
MEV IGG FLD QL IA: 23.2 AU/ML
MEV IGG+IGM SER-IMP: POSITIVE
MUV AB SER-ACNC: POSITIVE
MUV IGG SER QL IA: >300 AU/ML
OPIATES UR-MCNC: NEGATIVE NG/ML
OXYCODONE/OXYMORPHONE, URINE: NEGATIVE NG/ML
PCP UR-MCNC: NEGATIVE NG/ML
PH, URINE: 7.9
QUANTIFERON TB PLUS MITOGEN MINUS NIL: 5.75 IU/ML
QUANTIFERON TB PLUS NIL: 0.04 IU/ML
QUANTIFERON TB PLUS TB1 MINUS NIL: 0.05 IU/ML
QUANTIFERON TB PLUS TB2 MINUS NIL: 0.04 IU/ML
RUBV IGG FLD-ACNC: 7.32 INDEX
THC UR QL: NEGATIVE NG/ML

## 2024-11-09 NOTE — ED ADULT TRIAGE NOTE - HISTORY OF COVID-19 VACCINATION
Vascular f/u    Afeb, VSS    LLE edema markedly reduced.   VAC with serous drainage.   No CBC this am    Check labs  Tentative plan for formal BKA on Monday         Yes

## 2025-01-14 ENCOUNTER — TRANSCRIPTION ENCOUNTER (OUTPATIENT)
Age: 40
End: 2025-01-14

## 2025-02-21 ENCOUNTER — TRANSCRIPTION ENCOUNTER (OUTPATIENT)
Age: 40
End: 2025-02-21

## 2025-03-03 NOTE — ED PROVIDER NOTE - PMH
Detail Level: Detailed
Detail Level: Zone
Detail Level: Generalized
No pertinent past medical history
Sunscreen Recommendations: Isdin Eryfotona

## 2025-04-01 DIAGNOSIS — R92.8 OTHER ABNORMAL AND INCONCLUSIVE FINDINGS ON DIAGNOSTIC IMAGING OF BREAST: ICD-10-CM

## 2025-04-01 DIAGNOSIS — R92.30 DENSE BREASTS, UNSPECIFIED: ICD-10-CM

## 2025-04-01 DIAGNOSIS — D24.1 BENIGN NEOPLASM OF RIGHT BREAST: ICD-10-CM

## 2025-04-04 DIAGNOSIS — N64.4 MASTODYNIA: ICD-10-CM

## 2025-04-10 ENCOUNTER — APPOINTMENT (OUTPATIENT)
Dept: MAMMOGRAPHY | Facility: CLINIC | Age: 40
End: 2025-04-10
Payer: COMMERCIAL

## 2025-04-10 ENCOUNTER — RESULT REVIEW (OUTPATIENT)
Age: 40
End: 2025-04-10

## 2025-04-10 ENCOUNTER — APPOINTMENT (OUTPATIENT)
Dept: ULTRASOUND IMAGING | Facility: CLINIC | Age: 40
End: 2025-04-10

## 2025-04-10 ENCOUNTER — OUTPATIENT (OUTPATIENT)
Dept: OUTPATIENT SERVICES | Facility: HOSPITAL | Age: 40
LOS: 1 days | End: 2025-04-10
Payer: COMMERCIAL

## 2025-04-10 DIAGNOSIS — Z98.890 OTHER SPECIFIED POSTPROCEDURAL STATES: Chronic | ICD-10-CM

## 2025-04-10 DIAGNOSIS — Z98.891 HISTORY OF UTERINE SCAR FROM PREVIOUS SURGERY: Chronic | ICD-10-CM

## 2025-04-10 DIAGNOSIS — D24.1 BENIGN NEOPLASM OF RIGHT BREAST: ICD-10-CM

## 2025-04-10 DIAGNOSIS — Z90.49 ACQUIRED ABSENCE OF OTHER SPECIFIED PARTS OF DIGESTIVE TRACT: Chronic | ICD-10-CM

## 2025-04-10 DIAGNOSIS — Z33.2 ENCOUNTER FOR ELECTIVE TERMINATION OF PREGNANCY: Chronic | ICD-10-CM

## 2025-04-10 DIAGNOSIS — Z98.51 TUBAL LIGATION STATUS: Chronic | ICD-10-CM

## 2025-04-10 DIAGNOSIS — R92.8 OTHER ABNORMAL AND INCONCLUSIVE FINDINGS ON DIAGNOSTIC IMAGING OF BREAST: ICD-10-CM

## 2025-04-10 DIAGNOSIS — N64.4 MASTODYNIA: ICD-10-CM

## 2025-04-10 PROCEDURE — G0279: CPT | Mod: 26

## 2025-04-10 PROCEDURE — 77066 DX MAMMO INCL CAD BI: CPT

## 2025-04-10 PROCEDURE — 76641 ULTRASOUND BREAST COMPLETE: CPT | Mod: 26,50

## 2025-04-10 PROCEDURE — 76641 ULTRASOUND BREAST COMPLETE: CPT

## 2025-04-10 PROCEDURE — 77066 DX MAMMO INCL CAD BI: CPT | Mod: 26

## 2025-04-10 PROCEDURE — G0279: CPT

## 2025-04-11 ENCOUNTER — NON-APPOINTMENT (OUTPATIENT)
Age: 40
End: 2025-04-11

## 2025-04-21 ENCOUNTER — NON-APPOINTMENT (OUTPATIENT)
Age: 40
End: 2025-04-21

## 2025-04-22 ENCOUNTER — NON-APPOINTMENT (OUTPATIENT)
Age: 40
End: 2025-04-22

## 2025-04-23 ENCOUNTER — APPOINTMENT (OUTPATIENT)
Dept: PLASTIC SURGERY | Facility: CLINIC | Age: 40
End: 2025-04-23
Payer: COMMERCIAL

## 2025-04-23 VITALS
HEIGHT: 63 IN | SYSTOLIC BLOOD PRESSURE: 114 MMHG | WEIGHT: 152 LBS | HEART RATE: 73 BPM | TEMPERATURE: 98.1 F | BODY MASS INDEX: 26.93 KG/M2 | OXYGEN SATURATION: 100 % | DIASTOLIC BLOOD PRESSURE: 83 MMHG

## 2025-04-23 DIAGNOSIS — D24.1 BENIGN NEOPLASM OF RIGHT BREAST: ICD-10-CM

## 2025-04-23 PROCEDURE — 99215 OFFICE O/P EST HI 40 MIN: CPT

## 2025-05-02 ENCOUNTER — NON-APPOINTMENT (OUTPATIENT)
Age: 40
End: 2025-05-02

## 2025-05-02 ENCOUNTER — OUTPATIENT (OUTPATIENT)
Dept: OUTPATIENT SERVICES | Facility: HOSPITAL | Age: 40
LOS: 1 days | End: 2025-05-02
Payer: COMMERCIAL

## 2025-05-02 ENCOUNTER — APPOINTMENT (OUTPATIENT)
Dept: ULTRASOUND IMAGING | Facility: IMAGING CENTER | Age: 40
End: 2025-05-02
Payer: COMMERCIAL

## 2025-05-02 VITALS
DIASTOLIC BLOOD PRESSURE: 74 MMHG | OXYGEN SATURATION: 100 % | RESPIRATION RATE: 16 BRPM | SYSTOLIC BLOOD PRESSURE: 110 MMHG | TEMPERATURE: 99 F | HEART RATE: 82 BPM | WEIGHT: 157.41 LBS | HEIGHT: 63.5 IN

## 2025-05-02 DIAGNOSIS — Z01.818 ENCOUNTER FOR OTHER PREPROCEDURAL EXAMINATION: ICD-10-CM

## 2025-05-02 DIAGNOSIS — Z33.2 ENCOUNTER FOR ELECTIVE TERMINATION OF PREGNANCY: Chronic | ICD-10-CM

## 2025-05-02 DIAGNOSIS — Z98.890 OTHER SPECIFIED POSTPROCEDURAL STATES: Chronic | ICD-10-CM

## 2025-05-02 DIAGNOSIS — D24.1 BENIGN NEOPLASM OF RIGHT BREAST: ICD-10-CM

## 2025-05-02 DIAGNOSIS — Z98.51 TUBAL LIGATION STATUS: Chronic | ICD-10-CM

## 2025-05-02 DIAGNOSIS — Z98.891 HISTORY OF UTERINE SCAR FROM PREVIOUS SURGERY: Chronic | ICD-10-CM

## 2025-05-02 DIAGNOSIS — Z90.49 ACQUIRED ABSENCE OF OTHER SPECIFIED PARTS OF DIGESTIVE TRACT: Chronic | ICD-10-CM

## 2025-05-02 LAB
HCG SERPL-ACNC: <1 MIU/ML — SIGNIFICANT CHANGE UP
HCT VFR BLD CALC: 38.9 % — SIGNIFICANT CHANGE UP (ref 34.5–45)
HGB BLD-MCNC: 13 G/DL — SIGNIFICANT CHANGE UP (ref 11.5–15.5)
MCHC RBC-ENTMCNC: 27.1 PG — SIGNIFICANT CHANGE UP (ref 27–34)
MCHC RBC-ENTMCNC: 33.4 G/DL — SIGNIFICANT CHANGE UP (ref 32–36)
MCV RBC AUTO: 81.2 FL — SIGNIFICANT CHANGE UP (ref 80–100)
NRBC BLD AUTO-RTO: 0 /100 WBCS — SIGNIFICANT CHANGE UP (ref 0–0)
PLATELET # BLD AUTO: 306 K/UL — SIGNIFICANT CHANGE UP (ref 150–400)
RBC # BLD: 4.79 M/UL — SIGNIFICANT CHANGE UP (ref 3.8–5.2)
RBC # FLD: 12.7 % — SIGNIFICANT CHANGE UP (ref 10.3–14.5)
WBC # BLD: 4.77 K/UL — SIGNIFICANT CHANGE UP (ref 3.8–10.5)
WBC # FLD AUTO: 4.77 K/UL — SIGNIFICANT CHANGE UP (ref 3.8–10.5)

## 2025-05-02 PROCEDURE — A4648: CPT

## 2025-05-02 PROCEDURE — 19285 PERQ DEV BREAST 1ST US IMAG: CPT | Mod: RT

## 2025-05-02 PROCEDURE — 85027 COMPLETE CBC AUTOMATED: CPT

## 2025-05-02 PROCEDURE — 84702 CHORIONIC GONADOTROPIN TEST: CPT

## 2025-05-02 PROCEDURE — C1739: CPT

## 2025-05-02 PROCEDURE — 19285 PERQ DEV BREAST 1ST US IMAG: CPT

## 2025-05-02 PROCEDURE — 36415 COLL VENOUS BLD VENIPUNCTURE: CPT

## 2025-05-02 PROCEDURE — G0463: CPT

## 2025-05-02 RX ORDER — SODIUM CHLORIDE 9 G/1000ML
1000 INJECTION, SOLUTION INTRAVENOUS
Refills: 0 | Status: DISCONTINUED | OUTPATIENT
Start: 2025-05-06 | End: 2025-05-21

## 2025-05-02 NOTE — H&P PST ADULT - NSICDXPASTSURGICALHX_GEN_ALL_CORE_FT
Age appropriate PAST SURGICAL HISTORY:  H/O  section x 2 (  and )    History of cholecystectomy     History of myomectomy     S/P D&C (status post dilation and curettage) 2/2 missed AB in     S/P tubal ligation  with last     Termination of pregnancy (fetus)

## 2025-05-02 NOTE — H&P PST ADULT - HISTORY OF PRESENT ILLNESS
38y/o female medical h/o Benign neoplasm right breast, presents today for PST for scheduled Excisional Breast Biopsy Right on 5/6/25

## 2025-05-02 NOTE — H&P PST ADULT - NSICDXPASTMEDICALHX_GEN_ALL_CORE_FT
PAST MEDICAL HISTORY:  Benign neoplasm of right breast     Excessive menstruation     Hypertrophy of breast     Uterine fibroid

## 2025-05-02 NOTE — H&P PST ADULT - ATTENDING COMMENTS
The patient is a 39 year old female who is s/p right 6:00 retroareolar core biopsy with pathology revealing fibroadenoma. She presents to undergo elective excision of the right palpable breast mass. The patient is a 39 year old female who is s/p right 6:00 retroareolar core biopsy with pathology revealing fibroadenoma. She presents to undergo a right bernadette  localization excisional breast biopsy.

## 2025-05-02 NOTE — H&P PST ADULT - RESPIRATORY
clear to auscultation bilaterally/no wheezes/no rales/no rhonchi/no respiratory distress
right breast mass palpable at 6 o'clock

## 2025-05-02 NOTE — H&P PST ADULT - NSANTHOSAYNRD_GEN_A_CORE
neck 13inches/No. DIVYA screening performed.  STOP BANG Legend: 0-2 = LOW Risk; 3-4 = INTERMEDIATE Risk; 5-8 = HIGH Risk

## 2025-05-02 NOTE — H&P PST ADULT - PROBLEM SELECTOR PLAN 1
Pre-op instructions given. Pt verbalized understanding Pre-op instructions given. Pt verbalized understanding  Chlorhexidine wash instructions given  Pt instructed to HOLD all NSAIDs/Herbal supplement/Multivitamins 7 days before surgery  Pending: lab results

## 2025-05-05 ENCOUNTER — TRANSCRIPTION ENCOUNTER (OUTPATIENT)
Age: 40
End: 2025-05-05

## 2025-05-05 ENCOUNTER — NON-APPOINTMENT (OUTPATIENT)
Age: 40
End: 2025-05-05

## 2025-05-05 RX ORDER — B1/B2/B3/B5/B6/B12/VIT C/FOLIC 500-0.5 MG
1 TABLET ORAL
Refills: 0 | DISCHARGE

## 2025-05-05 RX ORDER — L.ACID/B.ANIMALIS,BIFI,INF,LON 3B CELL
1 CAPSULE ORAL
Refills: 0 | DISCHARGE

## 2025-05-05 NOTE — ASU PATIENT PROFILE, ADULT - NSICDXPASTSURGICALHX_GEN_ALL_CORE_FT
PAST SURGICAL HISTORY:  H/O  section x 2 (  and )    H/O right breast biopsy Gem     History of cholecystectomy     History of myomectomy     S/P D&C (status post dilation and curettage) 2/2 missed AB in     S/P tubal ligation  with last     Termination of pregnancy (fetus)

## 2025-05-06 ENCOUNTER — RESULT REVIEW (OUTPATIENT)
Age: 40
End: 2025-05-06

## 2025-05-06 ENCOUNTER — APPOINTMENT (OUTPATIENT)
Dept: PLASTIC SURGERY | Facility: HOSPITAL | Age: 40
End: 2025-05-06
Payer: COMMERCIAL

## 2025-05-06 ENCOUNTER — OUTPATIENT (OUTPATIENT)
Dept: OUTPATIENT SERVICES | Facility: HOSPITAL | Age: 40
LOS: 1 days | End: 2025-05-06
Payer: COMMERCIAL

## 2025-05-06 VITALS
DIASTOLIC BLOOD PRESSURE: 67 MMHG | SYSTOLIC BLOOD PRESSURE: 104 MMHG | RESPIRATION RATE: 16 BRPM | HEIGHT: 63.5 IN | HEART RATE: 64 BPM | OXYGEN SATURATION: 97 % | WEIGHT: 157.41 LBS | TEMPERATURE: 99 F

## 2025-05-06 VITALS
DIASTOLIC BLOOD PRESSURE: 66 MMHG | HEART RATE: 81 BPM | OXYGEN SATURATION: 98 % | TEMPERATURE: 99 F | RESPIRATION RATE: 16 BRPM | SYSTOLIC BLOOD PRESSURE: 102 MMHG

## 2025-05-06 DIAGNOSIS — Z98.890 OTHER SPECIFIED POSTPROCEDURAL STATES: Chronic | ICD-10-CM

## 2025-05-06 DIAGNOSIS — Z33.2 ENCOUNTER FOR ELECTIVE TERMINATION OF PREGNANCY: Chronic | ICD-10-CM

## 2025-05-06 DIAGNOSIS — Z98.891 HISTORY OF UTERINE SCAR FROM PREVIOUS SURGERY: Chronic | ICD-10-CM

## 2025-05-06 DIAGNOSIS — D24.1 BENIGN NEOPLASM OF RIGHT BREAST: ICD-10-CM

## 2025-05-06 DIAGNOSIS — Z98.51 TUBAL LIGATION STATUS: Chronic | ICD-10-CM

## 2025-05-06 DIAGNOSIS — Z90.49 ACQUIRED ABSENCE OF OTHER SPECIFIED PARTS OF DIGESTIVE TRACT: Chronic | ICD-10-CM

## 2025-05-06 PROCEDURE — 88307 TISSUE EXAM BY PATHOLOGIST: CPT | Mod: 26

## 2025-05-06 PROCEDURE — 76098 X-RAY EXAM SURGICAL SPECIMEN: CPT

## 2025-05-06 PROCEDURE — 88307 TISSUE EXAM BY PATHOLOGIST: CPT

## 2025-05-06 PROCEDURE — 19125 EXCISION BREAST LESION: CPT | Mod: RT

## 2025-05-06 RX ORDER — ONDANSETRON HCL/PF 4 MG/2 ML
4 VIAL (ML) INJECTION ONCE
Refills: 0 | Status: DISCONTINUED | OUTPATIENT
Start: 2025-05-06 | End: 2025-05-06

## 2025-05-06 RX ORDER — OXYCODONE HYDROCHLORIDE 30 MG/1
5 TABLET ORAL ONCE
Refills: 0 | Status: DISCONTINUED | OUTPATIENT
Start: 2025-05-06 | End: 2025-05-06

## 2025-05-06 RX ORDER — SODIUM CHLORIDE 9 G/1000ML
1000 INJECTION, SOLUTION INTRAVENOUS
Refills: 0 | Status: DISCONTINUED | OUTPATIENT
Start: 2025-05-06 | End: 2025-05-06

## 2025-05-06 RX ORDER — HYDROMORPHONE/SOD CHLOR,ISO/PF 2 MG/10 ML
0.5 SYRINGE (ML) INJECTION
Refills: 0 | Status: DISCONTINUED | OUTPATIENT
Start: 2025-05-06 | End: 2025-05-06

## 2025-05-06 RX ADMIN — SODIUM CHLORIDE 50 MILLILITER(S): 9 INJECTION, SOLUTION INTRAVENOUS at 13:00

## 2025-05-06 NOTE — ASU DISCHARGE PLAN (ADULT/PEDIATRIC) - FINANCIAL ASSISTANCE
Adi dissolvable PP BUL after mini lift ou. [facility] provides services at a reduced cost to those who are determined to be eligible through [facility]’s financial assistance program. Information regarding [facility]’s financial assistance program can be found by going to [email or web address] or by calling [phone].

## 2025-05-06 NOTE — BRIEF OPERATIVE NOTE - NSICDXBRIEFPROCEDURE_GEN_ALL_CORE_FT
PROCEDURES:  Excision, mass, breast, using radiological marker 06-May-2025 17:14:08 right 6:00 Gem  localization Palleschi, Susan M

## 2025-05-06 NOTE — ASU DISCHARGE PLAN (ADULT/PEDIATRIC) - PATIENT EDUCATION MATERIALS PROVIED
tylenol, ice pack/Provider pre-printed instructions given/Pre-printed instructions given for other (specify)

## 2025-05-07 PROCEDURE — 76098 X-RAY EXAM SURGICAL SPECIMEN: CPT | Mod: 26

## 2025-05-09 ENCOUNTER — NON-APPOINTMENT (OUTPATIENT)
Age: 40
End: 2025-05-09

## 2025-05-09 LAB — SURGICAL PATHOLOGY STUDY: SIGNIFICANT CHANGE UP

## 2025-05-21 ENCOUNTER — APPOINTMENT (OUTPATIENT)
Dept: PLASTIC SURGERY | Facility: CLINIC | Age: 40
End: 2025-05-21
Payer: COMMERCIAL

## 2025-05-21 VITALS
TEMPERATURE: 97.9 F | BODY MASS INDEX: 26.58 KG/M2 | SYSTOLIC BLOOD PRESSURE: 112 MMHG | HEIGHT: 63 IN | OXYGEN SATURATION: 100 % | WEIGHT: 150 LBS | DIASTOLIC BLOOD PRESSURE: 73 MMHG | HEART RATE: 70 BPM

## 2025-05-21 DIAGNOSIS — D24.1 BENIGN NEOPLASM OF RIGHT BREAST: ICD-10-CM

## 2025-05-21 PROCEDURE — 99024 POSTOP FOLLOW-UP VISIT: CPT

## 2025-06-03 ENCOUNTER — APPOINTMENT (OUTPATIENT)
Dept: PLASTIC SURGERY | Facility: CLINIC | Age: 40
End: 2025-06-03

## 2025-06-19 NOTE — ED PROVIDER NOTE - WET READ LAUNCH FT
Problem: Patient Care Overview  Goal: Plan of Care Review  Outcome: Ongoing (interventions implemented as appropriate)   01/05/19 2185   Coping/Psychosocial   Plan of Care Reviewed With patient   Plan of Care Review   Progress no change   OTHER   Outcome Summary Pt. continues to required a good deal of assistance with bed mobility and is limited in overall functional mobility. PTA recommended further therapy after discharge. Pt. states she would consider this if she could go to [facility], other than that she would return home. Pt. only walking about 60' with RWX and has impaired gait pattern due to weakness and pain. Will continue to work with her while she is here.          
SW consulted as patient is in the ER for a blood sugar check due to not having a  for her Dexcom.  SW spoke to Diabetes Ed about this and they will come speak with patient about getting a new one.  Attending, Dr. Wilcox, put the order in.  Patient updated and will wait for Diabetes Ed to come and talk with her prior to leaving.  FELI Jordan  
There are no Wet Read(s) to document.

## 2025-07-17 NOTE — H&P PST ADULT - HEART RATE (BEATS/MIN)
Received incoming call from call center with [facility] returning RN's call.  Please call [phone], option 1 then option 4 for assistance.    Thank you   90

## 2025-07-18 ENCOUNTER — APPOINTMENT (OUTPATIENT)
Dept: INTERNAL MEDICINE | Facility: CLINIC | Age: 40
End: 2025-07-18
Payer: COMMERCIAL

## 2025-07-18 VITALS
BODY MASS INDEX: 27.46 KG/M2 | RESPIRATION RATE: 17 BRPM | SYSTOLIC BLOOD PRESSURE: 115 MMHG | WEIGHT: 155 LBS | TEMPERATURE: 97.8 F | HEIGHT: 63 IN | OXYGEN SATURATION: 100 % | DIASTOLIC BLOOD PRESSURE: 79 MMHG | HEART RATE: 79 BPM

## 2025-07-18 PROBLEM — R92.8 ABNORMAL ULTRASOUND OF BREAST: Status: RESOLVED | Noted: 2023-05-18 | Resolved: 2025-07-18

## 2025-07-18 PROBLEM — Z11.59 SCREENING FOR VIRAL DISEASE: Status: ACTIVE | Noted: 2025-07-18

## 2025-07-18 PROBLEM — Z86.39 HISTORY OF HYPOKALEMIA: Status: RESOLVED | Noted: 2024-02-27 | Resolved: 2025-07-18

## 2025-07-18 PROBLEM — N64.4 PAIN OF RIGHT BREAST: Status: RESOLVED | Noted: 2025-04-04 | Resolved: 2025-07-18

## 2025-07-18 PROCEDURE — 99395 PREV VISIT EST AGE 18-39: CPT

## 2025-07-22 ENCOUNTER — APPOINTMENT (OUTPATIENT)
Dept: INTERNAL MEDICINE | Facility: CLINIC | Age: 40
End: 2025-07-22
Payer: COMMERCIAL

## 2025-07-22 DIAGNOSIS — E11.9 TYPE 2 DIABETES MELLITUS W/OUT COMPLICATIONS: ICD-10-CM

## 2025-07-22 LAB
ALBUMIN SERPL ELPH-MCNC: 4.6 G/DL
ALP BLD-CCNC: 55 U/L
ALT SERPL-CCNC: 15 U/L
ANION GAP SERPL CALC-SCNC: 11 MMOL/L
AST SERPL-CCNC: 20 U/L
BILIRUB SERPL-MCNC: 0.6 MG/DL
BUN SERPL-MCNC: 7 MG/DL
C TRACH RRNA SPEC QL NAA+PROBE: NOT DETECTED
CALCIUM SERPL-MCNC: 9.1 MG/DL
CHLORIDE SERPL-SCNC: 105 MMOL/L
CHOLEST SERPL-MCNC: 190 MG/DL
CO2 SERPL-SCNC: 22 MMOL/L
CREAT SERPL-MCNC: 0.83 MG/DL
EGFRCR SERPLBLD CKD-EPI 2021: 93 ML/MIN/1.73M2
ESTIMATED AVERAGE GLUCOSE: 197 MG/DL
GLUCOSE SERPL-MCNC: 79 MG/DL
HAV IGM SER QL: NONREACTIVE
HBA1C MFR BLD HPLC: 8.5 %
HBV CORE IGM SER QL: NONREACTIVE
HBV SURFACE AB SER QL: REACTIVE
HBV SURFACE AG SER QL: NONREACTIVE
HCT VFR BLD CALC: 39.5 %
HCV AB SER QL: NONREACTIVE
HCV S/CO RATIO: 0.32 S/CO
HDLC SERPL-MCNC: 73 MG/DL
HGB BLD-MCNC: 12.7 G/DL
HIV1+2 AB SPEC QL IA.RAPID: NONREACTIVE
LDLC SERPL-MCNC: 105 MG/DL
MCHC RBC-ENTMCNC: 26.8 PG
MCHC RBC-ENTMCNC: 32.2 G/DL
MCV RBC AUTO: 83.3 FL
N GONORRHOEA RRNA SPEC QL NAA+PROBE: NOT DETECTED
NONHDLC SERPL-MCNC: 117 MG/DL
PLATELET # BLD AUTO: 314 K/UL
POTASSIUM SERPL-SCNC: 4.2 MMOL/L
PROT SERPL-MCNC: 8 G/DL
RBC # BLD: 4.74 M/UL
RBC # FLD: 13.2 %
SODIUM SERPL-SCNC: 138 MMOL/L
SOURCE AMPLIFICATION: NORMAL
T PALLIDUM AB SER QL IA: NEGATIVE
TRIGL SERPL-MCNC: 64 MG/DL
TSH SERPL-ACNC: 1.5 UIU/ML
WBC # FLD AUTO: 4.03 K/UL

## 2025-07-22 PROCEDURE — G2211 COMPLEX E/M VISIT ADD ON: CPT | Mod: 95

## 2025-07-22 PROCEDURE — 99214 OFFICE O/P EST MOD 30 MIN: CPT | Mod: 95

## 2025-07-22 RX ORDER — BLOOD-GLUCOSE SENSOR
EACH MISCELLANEOUS
Qty: 6 | Refills: 0 | Status: ACTIVE | COMMUNITY
Start: 2025-07-22 | End: 1900-01-01

## 2025-07-22 RX ORDER — BLOOD-GLUCOSE,RECEIVER,CONT
EACH MISCELLANEOUS
Qty: 1 | Refills: 0 | Status: ACTIVE | COMMUNITY
Start: 2025-07-22 | End: 1900-01-01

## 2025-07-23 ENCOUNTER — TRANSCRIPTION ENCOUNTER (OUTPATIENT)
Age: 40
End: 2025-07-23

## 2025-08-04 RX ORDER — BLOOD-GLUCOSE METER
W/DEVICE KIT MISCELLANEOUS
Qty: 1 | Refills: 0 | Status: ACTIVE | COMMUNITY
Start: 2025-08-01 | End: 1900-01-01

## 2025-08-04 RX ORDER — BLOOD SUGAR DIAGNOSTIC
STRIP MISCELLANEOUS
Qty: 1 | Refills: 0 | Status: ACTIVE | COMMUNITY
Start: 2025-08-01 | End: 1900-01-01

## 2025-08-04 RX ORDER — LANCETS
EACH MISCELLANEOUS
Qty: 100 | Refills: 0 | Status: ACTIVE | COMMUNITY
Start: 2025-08-01 | End: 1900-01-01

## 2025-08-06 ENCOUNTER — TRANSCRIPTION ENCOUNTER (OUTPATIENT)
Age: 40
End: 2025-08-06

## 2025-09-04 ENCOUNTER — APPOINTMENT (OUTPATIENT)
Dept: PLASTIC SURGERY | Facility: CLINIC | Age: 40
End: 2025-09-04
Payer: COMMERCIAL

## 2025-09-04 VITALS
OXYGEN SATURATION: 95 % | RESPIRATION RATE: 17 BRPM | BODY MASS INDEX: 26.58 KG/M2 | HEART RATE: 97 BPM | SYSTOLIC BLOOD PRESSURE: 111 MMHG | TEMPERATURE: 98.4 F | HEIGHT: 63 IN | WEIGHT: 150 LBS | DIASTOLIC BLOOD PRESSURE: 75 MMHG

## 2025-09-04 DIAGNOSIS — M54.00 PANNICULITIS AFFECTING REGIONS OF NECK AND BACK, SITE UNSPECIFIED: ICD-10-CM

## 2025-09-04 DIAGNOSIS — M79.3 PANNICULITIS, UNSPECIFIED: ICD-10-CM

## 2025-09-04 DIAGNOSIS — E88.1 LIPODYSTROPHY, NOT ELSEWHERE CLASSIFIED: ICD-10-CM

## 2025-09-04 PROCEDURE — 99203 OFFICE O/P NEW LOW 30 MIN: CPT

## 2025-09-10 ENCOUNTER — NON-APPOINTMENT (OUTPATIENT)
Age: 40
End: 2025-09-10

## 2025-09-10 ENCOUNTER — APPOINTMENT (OUTPATIENT)
Dept: OPHTHALMOLOGY | Facility: CLINIC | Age: 40
End: 2025-09-10
Payer: COMMERCIAL

## 2025-09-10 PROCEDURE — 92133 CPTRZD OPH DX IMG PST SGM ON: CPT

## 2025-09-10 PROCEDURE — 92004 COMPRE OPH EXAM NEW PT 1/>: CPT

## 2025-09-16 ENCOUNTER — NON-APPOINTMENT (OUTPATIENT)
Age: 40
End: 2025-09-16

## (undated) DEVICE — LAP PAD 18 X 18"

## (undated) DEVICE — WARMING BLANKET LOWER ADULT

## (undated) DEVICE — SOL IRR POUR H2O 250ML

## (undated) DEVICE — SUT POLYSORB 3-0 30" V-20 UNDYED

## (undated) DEVICE — SUT SOFSILK 2-0 18" C-23

## (undated) DEVICE — PREP BETADINE KIT

## (undated) DEVICE — MEDICATION LABELS W MARKER

## (undated) DEVICE — DRSG STERISTRIPS 0.5 X 4"

## (undated) DEVICE — SOL IRR POUR NS 0.9% 500ML

## (undated) DEVICE — SPECIMEN CONTAINER 100ML

## (undated) DEVICE — POSITIONER PATIENT SAFETY STRAP 3X60"

## (undated) DEVICE — SUT POLYSORB 4-0 18" P-12 UNDYED

## (undated) DEVICE — DRAPE INSTRUMENT POUCH 6.75" X 11"

## (undated) DEVICE — PACK MINOR

## (undated) DEVICE — DRAPE TOWEL BLUE 17" X 24"

## (undated) DEVICE — SUT SURGIPRO II 3-0 18" C-14

## (undated) DEVICE — VENODYNE/SCD SLEEVE CALF MEDIUM

## (undated) DEVICE — SHEATH SURG GUIDE SCOUT DISP STRL

## (undated) DEVICE — SPONGE PEANUT REGULAR 3/8"

## (undated) DEVICE — DRSG COMBINE 5 X 9"